# Patient Record
Sex: MALE | Race: WHITE | Employment: FULL TIME | URBAN - METROPOLITAN AREA
[De-identification: names, ages, dates, MRNs, and addresses within clinical notes are randomized per-mention and may not be internally consistent; named-entity substitution may affect disease eponyms.]

---

## 2019-06-24 NOTE — PROGRESS NOTES
"Subjective     Zowey D Ru Everett is a 24 year old male who presents to clinic today for the following health issues:    HPI   Chief Complaint   Patient presents with     EVELYNHMelisaD     Would like to get back on medication     Health Maintenance     BRENDAN, TDAP     Patient presents to establish care  He has a history of ADHD and has been off of his medication for approximately 10 years now.  He's not entirely sure what he used to take.  He was assessed for ADHD at [facility] in 2002 and was put on concerta and ritalin, to which he expresses he had side effects.  He also states that he may have been on vyvanse, however he's not sure what dose and he experienced insomnia, weight loss, lack of appetite and he felt like a 'zombie'.  Regarding work schedule, he usually has to wake up early to take care of 2yo daughter, he works at [employer] from 3pm to 11pm and has had some trouble at work with finishing tasks and focusing.  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  Full [facility] assessment was reviewed via PDF on patient's phone.    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      BP Readings from Last 3 Encounters:   06/25/19 110/76    Wt Readings from Last 3 Encounters:   06/25/19 61 kg (134 lb 6.4 oz)        Family, social, past medical, Reviewed and updated as needed this visit by Provider  Tobacco  Allergies  Meds  Problems  Med Hx  Surg Hx  Fam Hx  Soc Hx          Review of Systems    ROS: 10 point ROS neg other than the symptoms noted above in the HPI.      Objective    /76   Pulse 69   Temp 97  F (36.1  C) (Oral)   Resp 18   Ht 1.73 m (5' 8.11\")   Wt 61 kg (134 lb 6.4 oz)   SpO2 97%   BMI 20.37 kg/m    Body mass index is 20.37 kg/m .  Physical Exam   GENERAL: healthy, alert and no " distress  EYES: Eyes grossly normal to inspection, PERRL and conjunctivae and sclerae normal  NECK: no adenopathy, no asymmetry, masses, or scars and thyroid normal to palpation  RESP: lungs clear to auscultation - no rales, rhonchi or wheezes  CV: regular rate and rhythm, normal S1 S2, no S3 or S4, no murmur, click or rub, no peripheral edema and peripheral pulses strong  SKIN: no suspicious lesions or rashes  PSYCH: mentation appears normal, affect normal/bright          Assessment & Plan       ICD-10-CM    1. Attention deficit hyperactivity disorder (ADHD), combined type F90.2 amphetamine-dextroamphetamine (ADDERALL) 20 MG tablet   2. ADHD (attention deficit hyperactivity disorder), combined type F90.2    3. Traumatic tear of left rotator cuff, unspecified tear extent, sequela S46.012S    4. 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  EDS - patient declined genetic testing at this time    A total of 30 minutes spent face-to-face with greater than 50% of the time spent in counseling and coordinating cares of the issues above     Follow-up in 1 month    Akbar Fajardo MD  [facility]

## 2019-06-25 ENCOUNTER — OFFICE VISIT (OUTPATIENT)
Dept: FAMILY MEDICINE | Facility: CLINIC | Age: 24
End: 2019-06-25

## 2019-06-25 VITALS
SYSTOLIC BLOOD PRESSURE: 110 MMHG | RESPIRATION RATE: 18 BRPM | WEIGHT: 134.4 LBS | HEIGHT: 68 IN | HEART RATE: 69 BPM | BODY MASS INDEX: 20.37 KG/M2 | DIASTOLIC BLOOD PRESSURE: 76 MMHG | TEMPERATURE: 97 F | OXYGEN SATURATION: 97 %

## 2019-06-25 DIAGNOSIS — S46.012S TRAUMATIC TEAR OF LEFT ROTATOR CUFF, UNSPECIFIED TEAR EXTENT, SEQUELA: ICD-10-CM

## 2019-06-25 DIAGNOSIS — Q79.60 EHLERS-DANLOS SYNDROME: ICD-10-CM

## 2019-06-25 DIAGNOSIS — F90.2 ATTENTION DEFICIT HYPERACTIVITY DISORDER (ADHD), COMBINED TYPE: Primary | ICD-10-CM

## 2019-06-25 DIAGNOSIS — F90.2 ADHD (ATTENTION DEFICIT HYPERACTIVITY DISORDER), COMBINED TYPE: ICD-10-CM

## 2019-06-25 PROCEDURE — 99203 OFFICE O/P NEW LOW 30 MIN: CPT | Performed by: FAMILY MEDICINE

## 2019-06-25 RX ORDER — DEXTROAMPHETAMINE SACCHARATE, AMPHETAMINE ASPARTATE, DEXTROAMPHETAMINE SULFATE AND AMPHETAMINE SULFATE 5; 5; 5; 5 MG/1; MG/1; MG/1; MG/1
20 TABLET ORAL 2 TIMES DAILY
Qty: 60 TABLET | Refills: 0 | Status: SHIPPED | OUTPATIENT
Start: 2019-06-25 | End: 2019-10-24

## 2019-06-25 SDOH — HEALTH STABILITY: MENTAL HEALTH: HOW OFTEN DO YOU HAVE A DRINK CONTAINING ALCOHOL?: NEVER

## 2019-06-25 ASSESSMENT — MIFFLIN-ST. JEOR: SCORE: 1575.88

## 2019-07-16 ENCOUNTER — TELEPHONE (OUTPATIENT)
Dept: FAMILY MEDICINE | Facility: CLINIC | Age: 24
End: 2019-07-16

## 2019-07-16 NOTE — TELEPHONE ENCOUNTER
Reason for Call:  Other :PATIENT DROP OFF PAPERS TO BE LOOKED [id number]}    Detailed comments: **DR. PATEL REQUEST THESE PAPER FROM [facility]    Phone Number Patient can be reached at: [phone]    Best Time: ANY    Can we leave a detailed message on this number? YES    Call taken on 7/16/2019 at 4:09 PM by Dinorah Miner

## 2019-07-19 ENCOUNTER — OFFICE VISIT (OUTPATIENT)
Dept: FAMILY MEDICINE | Facility: CLINIC | Age: 24
End: 2019-07-19

## 2019-07-19 VITALS
OXYGEN SATURATION: 99 % | SYSTOLIC BLOOD PRESSURE: 108 MMHG | TEMPERATURE: 98.5 F | HEART RATE: 77 BPM | DIASTOLIC BLOOD PRESSURE: 60 MMHG | WEIGHT: 138 LBS | BODY MASS INDEX: 20.92 KG/M2

## 2019-07-19 DIAGNOSIS — F90.2 ATTENTION DEFICIT HYPERACTIVITY DISORDER (ADHD), COMBINED TYPE: ICD-10-CM

## 2019-07-19 PROCEDURE — 99213 OFFICE O/P EST LOW 20 MIN: CPT | Performed by: FAMILY MEDICINE

## 2019-07-19 RX ORDER — DEXTROAMPHETAMINE SACCHARATE, AMPHETAMINE ASPARTATE, DEXTROAMPHETAMINE SULFATE AND AMPHETAMINE SULFATE 5; 5; 5; 5 MG/1; MG/1; MG/1; MG/1
20 TABLET ORAL 2 TIMES DAILY
Qty: 60 TABLET | Refills: 0 | Status: CANCELLED | OUTPATIENT
Start: 2019-07-19

## 2019-07-19 RX ORDER — DEXTROAMPHETAMINE SACCHARATE, AMPHETAMINE ASPARTATE, DEXTROAMPHETAMINE SULFATE AND AMPHETAMINE SULFATE 5; 5; 5; 5 MG/1; MG/1; MG/1; MG/1
20 TABLET ORAL 2 TIMES DAILY
Qty: 60 TABLET | Refills: 0 | Status: SHIPPED | OUTPATIENT
Start: 2019-08-26 | End: 2019-10-24

## 2019-07-19 RX ORDER — DEXTROAMPHETAMINE SACCHARATE, AMPHETAMINE ASPARTATE, DEXTROAMPHETAMINE SULFATE AND AMPHETAMINE SULFATE 5; 5; 5; 5 MG/1; MG/1; MG/1; MG/1
20 TABLET ORAL 2 TIMES DAILY
Qty: 60 TABLET | Refills: 0 | Status: SHIPPED | OUTPATIENT
Start: 2019-09-26 | End: 2019-10-26

## 2019-07-19 RX ORDER — DEXTROAMPHETAMINE SACCHARATE, AMPHETAMINE ASPARTATE, DEXTROAMPHETAMINE SULFATE AND AMPHETAMINE SULFATE 5; 5; 5; 5 MG/1; MG/1; MG/1; MG/1
20 TABLET ORAL 2 TIMES DAILY
Qty: 60 TABLET | Refills: 0 | Status: SHIPPED | OUTPATIENT
Start: 2019-07-26 | End: 2019-10-24

## 2019-07-19 NOTE — PROGRESS NOTES
Subjective     Rose Everett is a 24 year old male who presents to clinic today for the following health issues:    HPI   Chief Complaint   Patient presents with     RECHECK     ADHD follow up     Health Maintenance     PHQ2, TDAP     Patient presents for ADHD follow-up visit.    Adderall 20mg IR BID has been effective for him.  He's meeting goals at work, able to complete tasks effectively.  No side effects reported.  -------------------------------------    BP Readings from Last 3 Encounters:   07/19/19 108/60   06/25/19 110/76    Wt Readings from Last 3 Encounters:   07/19/19 62.6 kg (138 lb)   06/25/19 61 kg (134 lb 6.4 oz)        Reviewed and updated as needed this visit by Provider  Tobacco  Allergies  Meds  Problems  Med Hx  Surg Hx  Fam Hx         Review of Systems   ROS COMP: Constitutional, HEENT, cardiovascular, pulmonary, gi and gu systems are negative, except as otherwise noted.      Objective    /60   Pulse 77   Temp 98.5  F (36.9  C) (Oral)   Wt 62.6 kg (138 lb)   SpO2 99%   BMI 20.92 kg/m     Physical Exam   GENERAL: healthy, alert and no distress  EYES: Eyes grossly normal to inspection, PERRL and conjunctivae and sclerae normal  NECK: no adenopathy, no asymmetry, masses, or scars and thyroid normal to palpation  RESP: lungs clear to auscultation - no rales, rhonchi or wheezes  CV: regular rate and rhythm, normal S1 S2, no S3 or S4, no murmur, click or rub, no peripheral edema and peripheral pulses strong  SKIN: no suspicious lesions or rashes  PSYCH: mentation appears normal, affect normal/bright          Assessment & Plan       ICD-10-CM    1. Attention deficit hyperactivity disorder (ADHD), combined type F90.2 amphetamine-dextroamphetamine (ADDERALL) 20 MG tablet     amphetamine-dextroamphetamine (ADDERALL) 20 MG tablet     amphetamine-dextroamphetamine (ADDERALL) 20 MG tablet        Medication refill, follow-up in 3-6 months    Return in about 6 months (around  1/19/2020) for Next Scheduled visit with PCP.    Akbar Fajardo MD  [facility]

## 2019-08-22 ENCOUNTER — TELEPHONE (OUTPATIENT)
Dept: FAMILY MEDICINE | Facility: CLINIC | Age: 24
End: 2019-08-22

## 2019-08-22 NOTE — TELEPHONE ENCOUNTER
Called patient and informed he has scripts from the last OV. Patient then informed he is needing an early fill due to death in family and needing to leave state. Then called pharmacy and informed patient needing early pickup and MD then confirmed with pharmacy and gave verbal okay. MA then called patient back and informed that pharmacy has been given the okay to fill script early. Patient thanked MA. No further questions at this time. Tammy Young MA

## 2019-08-22 NOTE — TELEPHONE ENCOUNTER
Reason for call:  Medication     If this is a refill request, has the caller requested the refill from the pharmacy already? Yes     Will the patient be using a [facility]? No     Name of the pharmacy and phone number for the current request: [facility] [phone]    Name of the medication requested: amphetamine-dextroamphetamine (ADDERALL) 20 MG tablet    Other request: Call patient to     Phone number to reach patient:  Other phone number:  [phone]    Best Time:  any    Can we leave a detailed message on this number?  YES

## 2019-10-24 ENCOUNTER — OFFICE VISIT (OUTPATIENT)
Dept: FAMILY MEDICINE | Facility: CLINIC | Age: 24
End: 2019-10-24

## 2019-10-24 VITALS
OXYGEN SATURATION: 100 % | WEIGHT: 138 LBS | RESPIRATION RATE: 16 BRPM | HEART RATE: 80 BPM | HEIGHT: 68 IN | BODY MASS INDEX: 20.92 KG/M2 | SYSTOLIC BLOOD PRESSURE: 134 MMHG | DIASTOLIC BLOOD PRESSURE: 86 MMHG | TEMPERATURE: 97.7 F

## 2019-10-24 DIAGNOSIS — F90.9 ATTENTION DEFICIT HYPERACTIVITY DISORDER (ADHD), UNSPECIFIED ADHD TYPE: Primary | ICD-10-CM

## 2019-10-24 PROCEDURE — 99213 OFFICE O/P EST LOW 20 MIN: CPT | Performed by: FAMILY MEDICINE

## 2019-10-24 RX ORDER — DEXTROAMPHETAMINE SACCHARATE, AMPHETAMINE ASPARTATE, DEXTROAMPHETAMINE SULFATE AND AMPHETAMINE SULFATE 5; 5; 5; 5 MG/1; MG/1; MG/1; MG/1
20 TABLET ORAL 2 TIMES DAILY
Qty: 60 TABLET | Refills: 0 | Status: SHIPPED | OUTPATIENT
Start: 2019-10-24 | End: 2019-11-23

## 2019-10-24 RX ORDER — DEXTROAMPHETAMINE SACCHARATE, AMPHETAMINE ASPARTATE, DEXTROAMPHETAMINE SULFATE AND AMPHETAMINE SULFATE 5; 5; 5; 5 MG/1; MG/1; MG/1; MG/1
20 TABLET ORAL 2 TIMES DAILY
Qty: 60 TABLET | Refills: 0 | Status: SHIPPED | OUTPATIENT
Start: 2019-12-23 | End: 2020-01-22

## 2019-10-24 RX ORDER — DEXTROAMPHETAMINE SACCHARATE, AMPHETAMINE ASPARTATE, DEXTROAMPHETAMINE SULFATE AND AMPHETAMINE SULFATE 5; 5; 5; 5 MG/1; MG/1; MG/1; MG/1
20 TABLET ORAL 2 TIMES DAILY
Qty: 60 TABLET | Refills: 0 | Status: SHIPPED | OUTPATIENT
Start: 2019-11-23 | End: 2019-12-23

## 2019-10-24 ASSESSMENT — MIFFLIN-ST. JEOR: SCORE: 1592.21

## 2019-10-24 NOTE — PATIENT INSTRUCTIONS
Patient Education     Treating Attention-Deficit/Hyperactivity Disorder (ADHD) in Adults  Attention-deficit/hyperactivity disorder (ADHD) starts in childhood. It may continue throughout your life. When it does, it may affect your job and even your relationships. Fortunately, with help, you can manage ADHD.     Treatment for ADHD can help you achieve your goals.   Therapy  Your therapist can help you learn healthy ways to cope with ADHD. Sometimes, your partner or family may attend your sessions with you. This helps them understand more about your disorder.  Coaching  An ADHD  works with you to achieve your goals. You ll learn the best ways to manage your time. You ll also learn to avoid clutter and noise that may distract you. In time, your life will have more order and structure. And your  will provide support and feedback on your progress.  Work  Look for jobs where you can be free and creative. Stay away from those that are dull or centered on details. You may still need to make a special effort. The following tips may help:    Try to work at home, at least part time.    Ask for a private office.    Use headphones to muffle noise.    Work on more than one project at the same time. When you get bored with one, switch to the other.    Work on boring tasks when you feel most alert.    Have a schedule for each day.    Ask your  or  to help with details.    Use a day planner and to-do lists. Write yourself notes.    Reward yourself when you finish a task.  Medicines  In some cases, medicines can help control symptoms of ADHD. Most often, you'll use medicine along with therapy, coaching, and lifestyle changes. Your healthcare provider may prescribe a stimulant to help you stay focused. Or you may take a type of antidepressant. It may take some time to find what works best for you. Keep in mind that medicines don t cure ADHD. And they may cause side effects such as headaches, trouble  sleeping, or stomach problems. Take your medicine as prescribed. If you re bothered by side effects, be sure to tell your healthcare provider.  Always talk with your healthcare provider before making any changes to your medicine.  Date Last Reviewed: 1/1/2017 2000-2018 [employer]. [address] [place] [id number]. All rights reserved. This information is not intended as a substitute for professional medical care. Always follow your healthcare professional's instructions.           Patient Education     Amphetamine; Dextroamphetamine tablets  Brand Name: Adderall  What is this medicine?  AMPHETAMINE; DEXTROAMPHETAMINE(am FET a meen; dex troe am FET a meen) is used to treat attention-deficit hyperactivity disorder (ADHD). It may also be used for narcolepsy. Federal law prohibits giving this medicine to any person other than the person for whom it was prescribed. Do not share this medicine with anyone else.  How should I use this medicine?  Take this medicine by mouth with a glass of water. Follow the directions on the prescription label. Take your doses at regular intervals. Do not take your medicine more often than directed. Do not suddenly stop your medicine. You must gradually reduce the dose or you may feel withdrawal effects. Ask your doctor or health care professional for advice.  Talk to your pediatrician regarding the use of this medicine in children. Special care may be needed. While this drug may be prescribed for children as young as 3 years for selected conditions, precautions do apply.  What side effects may I notice from receiving this medicine?  Side effects that you should report to your doctor or health care professional as soon as possible:    allergic reactions like skin rash, itching or hives, swelling of the face, lips, or tongue    anxious    breathing problems    changes in emotions or moods    changes in vision    chest pain or chest tightness    fast, irregular  heartbeat    fingers or toes feel numb, cool, painful    hallucination, loss of contact with reality    high blood pressure    males: prolonged or painful erection    seizures    signs and symptoms of serotonin syndrome like confusion, increased sweating, fever, tremor, stiff muscles, diarrhea    signs and symptoms of a stroke like changes in vision; confusion; trouble speaking or understanding; severe headaches; sudden numbness or weakness of the face, arm or leg; trouble walking; dizziness; loss of balance or coordination    suicidal thoughts or other mood changes    uncontrollable head, mouth, neck, arm, or leg movements  Side effects that usually do not require medical attention (report to your doctor or health care professional if they continue or are bothersome):    dry mouth    headache    irritability    loss of appetite    nausea    trouble sleeping    weight loss  What may interact with this medicine?  Do not take this medicine with any of the following medications:    MAOIs like Carbex, Eldepryl, Marplan, Nardil, and Parnate    other stimulant medicines for attention disorders  This medicine may also interact with the following medications:    acetazolamide    ammonium chloride    antacids    ascorbic acid    atomoxetine    caffeine    certain medicines for blood pressure    certain medicines for depression, anxiety, or psychotic disturbances    certain medicines for seizures like carbamazepine, phenobarbital, phenytoin    certain medicines for stomach problems like cimetidine, ranitidine, famotidine, esomeprazole, omeprazole, lansoprazole, pantoprazole    lithium    medicines for colds and breathing difficulties    medicines for diabetes    medicines or dietary supplements for weight loss or to stay awake    methenamine    narcotic medicines for pain    quinidine    ritonavir    sodium bicarbonate    [place]'s wort  What if I miss a dose?  If you miss a dose, take it as soon as you can. If it is almost  time for your next dose, take only that dose. Do not take double or extra doses.  Where should I keep my medicine?  Keep out of the reach of children. This medicine can be abused. Keep your medicine in a safe place to protect it from theft. Do not share this medicine with anyone. Selling or giving away this medicine is dangerous and against the law.  Store at room temperature between 15 and 30 degrees C (59 and 86 degrees F). Keep container tightly closed. Throw away any unused medicine after the expiration date. Dispose of properly. This medicine may cause accidental overdose and death if it is taken by other adults, children, or pets. Mix any unused medicine with a substance like cat litter or coffee grounds. Then throw the medicine away in a sealed container like a sealed bag or a coffee can with a lid. Do not use the medicine after the expiration date.  What should I tell my health care provider before I take this medicine?  They need to know if you have any of these conditions:    anxiety or panic attacks    circulation problems in fingers and toes    glaucoma    hardening or blockages of the arteries or heart blood vessels    heart disease or a heart defect    high blood pressure    history of a drug or alcohol abuse problem    history of stroke    kidney disease    liver disease    mental illness    seizures    suicidal thoughts, plans, or attempt; a previous suicide attempt by you or a family member    thyroid disease    Tourette's syndrome    an unusual or allergic reaction to dextroamphetamine, other amphetamines, other medicines, foods, dyes, or preservatives    pregnant or trying to get pregnant    breast-feeding  What should I watch for while using this medicine?  Visit your doctor or health care professional for regular checks on your progress. This prescription requires that you follow special procedures with your doctor and pharmacy. You will need to have a new written prescription from your doctor  every time you need a refill.  This medicine may affect your concentration, or hide signs of tiredness. Until you know how this medicine affects you, do not drive, ride a bicycle, use machinery, or do anything that needs mental alertness.  Tell your doctor or health care professional if this medicine loses its effects, or if you feel you need to take more than the prescribed amount. Do not change the dosage without talking to your doctor or health care professional.  Decreased appetite is a common side effect when starting this medicine. Eating small, frequent meals or snacks can help. Talk to your doctor if you continue to have poor eating habits. Height and weight growth of a child taking this medicine will be monitored closely.  Do not take this medicine close to bedtime. It may prevent you from sleeping.  If you are going to need surgery, a MRI, CT scan, or other procedure, tell your doctor that you are taking this medicine. You may need to stop taking this medicine before the procedure.  Tell your doctor or healthcare professional right away if you notice unexplained wounds on your fingers and toes while taking this medicine. You should also tell your healthcare provider if you experience numbness or pain, changes in the skin color, or sensitivity to temperature in your fingers or toes.  NOTE:This sheet is a summary. It may not cover all possible information. If you have questions about this medicine, talk to your doctor, pharmacist, or health care provider. Copyright  2019 [employer]

## 2019-10-24 NOTE — PROGRESS NOTES
"Subjective     Rose GANDHI Ru Everett is a 24 year old male who presents to clinic today for the following health issues:    HPI   Chief Complaint   Patient presents with     Recheck Medication     amphetamine-dextroamphetamine (ADDERALL) 20 MG tablet     Patient presents for ADHD follow-up visit  Doing well, meeting goals, effective at work  No reported side effects        BP Readings from Last 3 Encounters:   10/24/19 134/86   07/19/19 108/60   06/25/19 110/76    Wt Readings from Last 3 Encounters:   10/24/19 62.6 kg (138 lb)   07/19/19 62.6 kg (138 lb)   06/25/19 61 kg (134 lb 6.4 oz)                      Reviewed and updated as needed this visit by Provider  Tobacco  Allergies  Meds  Problems  Med Hx  Surg Hx  Fam Hx         Review of Systems   ROS COMP: Constitutional, HEENT, cardiovascular, pulmonary, gi and gu systems are negative, except as otherwise noted.      Objective    /86   Pulse 80   Temp 97.7  F (36.5  C) (Oral)   Resp 16   Ht 1.73 m (5' 8.11\")   Wt 62.6 kg (138 lb)   SpO2 100%   BMI 20.92 kg/m    Body mass index is 20.92 kg/m .  Physical Exam   GENERAL: healthy, alert and no distress  EYES: Eyes grossly normal to inspection, PERRL and conjunctivae and sclerae normal  NECK: no adenopathy, no asymmetry, masses, or scars and thyroid normal to palpation  CV: regular rate and rhythm, normal S1 S2, no S3 or S4, no murmur, click or rub, no peripheral edema and peripheral pulses strong  SKIN: no suspicious lesions or rashes  PSYCH: mentation appears normal, affect normal/bright          Assessment & Plan       ICD-10-CM    1. Attention deficit hyperactivity disorder (ADHD), unspecified ADHD type F90.9 amphetamine-dextroamphetamine (ADDERALL) 20 MG tablet     amphetamine-dextroamphetamine (ADDERALL) 20 MG tablet     amphetamine-dextroamphetamine (ADDERALL) 20 MG tablet        Tobacco Cessation:   reports that he has been smoking cigarettes. He has been smoking about 0.20 packs per day. " He has never used smokeless tobacco.      Return in about 6 months (around 4/24/2020) for ADHD.    Akbar Fajardo MD  [facility]

## 2019-12-16 DIAGNOSIS — F90.9 ATTENTION DEFICIT HYPERACTIVITY DISORDER (ADHD), UNSPECIFIED ADHD TYPE: ICD-10-CM

## 2019-12-16 RX ORDER — DEXTROAMPHETAMINE SACCHARATE, AMPHETAMINE ASPARTATE, DEXTROAMPHETAMINE SULFATE AND AMPHETAMINE SULFATE 5; 5; 5; 5 MG/1; MG/1; MG/1; MG/1
20 TABLET ORAL 2 TIMES DAILY
Qty: 60 TABLET | Refills: 0 | Status: CANCELLED | OUTPATIENT
Start: 2019-12-16

## 2019-12-16 RX ORDER — DEXTROAMPHETAMINE SACCHARATE, AMPHETAMINE ASPARTATE, DEXTROAMPHETAMINE SULFATE AND AMPHETAMINE SULFATE 5; 5; 5; 5 MG/1; MG/1; MG/1; MG/1
20 TABLET ORAL 2 TIMES DAILY
Qty: 60 TABLET | Refills: 0 | Status: CANCELLED | OUTPATIENT
Start: 2019-12-23

## 2019-12-16 NOTE — TELEPHONE ENCOUNTER
Controlled Substance Refill Request for amphetamine-dextroamphetamine (ADDERALL) 20 MG tablet  Problem List Complete:  No     PROVIDER TO CONSIDER COMPLETION OF PROBLEM LIST AND OVERVIEW/CONTROLLED SUBSTANCE AGREEMENT    Last Written Prescription Date:  12/23/2019  Last Fill Quantity: 60,   # refills: 0    THE MOST RECENT OFFICE VISIT MUST BE WITHIN THE PAST 3 MONTHS. AT LEAST ONE FACE TO FACE VISIT MUST OCCUR EVERY 6 MONTHS. ADDITIONAL VISITS CAN BE VIRTUAL.  (THIS STATEMENT SHOULD BE DELETED.)    Last Office Visit with [facility] primary care provider: 10/24/2019    Future Office visit:     Controlled substance agreement:   Encounter-Level CSA:    There are no encounter-level csa.     Patient-Level CSA:    There are no patient-level csa.         Last Urine Drug Screen: No results found for: CDAUT, No results found for: COMDAT, No results found for: THC13, PCP13, COC13, MAMP13, OPI13, AMP13, BZO13, TCA13, MTD13, BAR13, OXY13, PPX13, BUP13         [email or web address]       checked in past 3 months?  No, route to RN

## 2019-12-16 NOTE — TELEPHONE ENCOUNTER
Reason for call:  Medication   If this is a refill request, has the caller requested the refill from the pharmacy already? No  Will the patient be using a [facility]? No  Name of the pharmacy and phone number for the current request: [id number] - [place], [place] - [address] AT [facility]  [phone]    Name of the medication requested: adderall    Other request: Patient will be leaving for [place] on 12-18-19 thur 1-5-20 and is due to refill his medication on 12-23-19. Can this be filled early? Please call.    Phone number to reach patient:  Cell number on file:    Telephone Information:   Mobile [phone]       Best Time:  Before 4:00 pm    Can we leave a detailed message on this number?  YES

## 2019-12-17 NOTE — TELEPHONE ENCOUNTER
Patient stopped in clinic for update on medication refill on 12/17/19 at 2:00PM. Instructed patient to call [facility] to ask them if prescription can be refilled earlier than December 23rd, 2019 due to going out of the state for a month. Also, informed patient he will need to set up an office visit with his primary to receive more refills on the Adderall after this last refill.    Luis F Shaihd  Patient Representative

## 2019-12-19 NOTE — TELEPHONE ENCOUNTER
Left message for patient to return call to clinic    Jazmín Ellington MA on 12/19/2019 at 9:01 AM

## 2020-01-21 ENCOUNTER — OFFICE VISIT (OUTPATIENT)
Dept: FAMILY MEDICINE | Facility: CLINIC | Age: 25
End: 2020-01-21

## 2020-01-21 VITALS
BODY MASS INDEX: 21.19 KG/M2 | OXYGEN SATURATION: 100 % | RESPIRATION RATE: 16 BRPM | DIASTOLIC BLOOD PRESSURE: 72 MMHG | SYSTOLIC BLOOD PRESSURE: 126 MMHG | WEIGHT: 139.8 LBS | HEIGHT: 68 IN | TEMPERATURE: 98 F | HEART RATE: 110 BPM

## 2020-01-21 DIAGNOSIS — F90.9 ATTENTION DEFICIT HYPERACTIVITY DISORDER (ADHD), UNSPECIFIED ADHD TYPE: Primary | ICD-10-CM

## 2020-01-21 PROCEDURE — 99213 OFFICE O/P EST LOW 20 MIN: CPT | Performed by: FAMILY MEDICINE

## 2020-01-21 RX ORDER — DEXTROAMPHETAMINE SACCHARATE, AMPHETAMINE ASPARTATE, DEXTROAMPHETAMINE SULFATE AND AMPHETAMINE SULFATE 5; 5; 5; 5 MG/1; MG/1; MG/1; MG/1
20 TABLET ORAL 2 TIMES DAILY
Qty: 60 TABLET | Refills: 0 | Status: SHIPPED | OUTPATIENT
Start: 2020-03-23 | End: 2020-04-22

## 2020-01-21 RX ORDER — DEXTROAMPHETAMINE SACCHARATE, AMPHETAMINE ASPARTATE, DEXTROAMPHETAMINE SULFATE AND AMPHETAMINE SULFATE 5; 5; 5; 5 MG/1; MG/1; MG/1; MG/1
20 TABLET ORAL 2 TIMES DAILY
Qty: 60 TABLET | Refills: 0 | Status: SHIPPED | OUTPATIENT
Start: 2020-01-21 | End: 2020-02-20

## 2020-01-21 RX ORDER — DEXTROAMPHETAMINE SACCHARATE, AMPHETAMINE ASPARTATE, DEXTROAMPHETAMINE SULFATE AND AMPHETAMINE SULFATE 5; 5; 5; 5 MG/1; MG/1; MG/1; MG/1
20 TABLET ORAL 2 TIMES DAILY
Qty: 60 TABLET | Refills: 0 | Status: SHIPPED | OUTPATIENT
Start: 2020-02-21 | End: 2020-03-22

## 2020-01-21 ASSESSMENT — MIFFLIN-ST. JEOR: SCORE: 1600.38

## 2020-01-21 NOTE — PROGRESS NOTES
"Subjective     Rose Everett is a 24 year old male who presents to clinic today for the following health issues:    HPI   Chief Complaint   Patient presents with     Recheck Medication     amphetamine-dextroamphetamine (ADDERALL) 20 MG tablet     Patient presents for ADHD follow-up visit.  No reported side effects, sleeping well, no weight loss, no appetite changes or palpitations, at goal in terms of productivity.    Wt Readings from Last 4 Encounters:   01/21/20 63.4 kg (139 lb 12.8 oz)   10/24/19 62.6 kg (138 lb)   07/19/19 62.6 kg (138 lb)   06/25/19 61 kg (134 lb 6.4 oz)           BP Readings from Last 3 Encounters:   01/21/20 126/72   10/24/19 134/86   07/19/19 108/60    Wt Readings from Last 3 Encounters:   01/21/20 63.4 kg (139 lb 12.8 oz)   10/24/19 62.6 kg (138 lb)   07/19/19 62.6 kg (138 lb)                      Reviewed and updated as needed this visit by Provider  Tobacco  Allergies  Meds  Problems  Med Hx  Surg Hx  Fam Hx         Review of Systems   ROS COMP: Constitutional, HEENT, cardiovascular, pulmonary, gi and gu systems are negative, except as otherwise noted.      Objective    /72   Pulse 110   Temp 98  F (36.7  C) (Oral)   Resp 16   Ht 1.73 m (5' 8.11\")   Wt 63.4 kg (139 lb 12.8 oz)   SpO2 100%   BMI 21.19 kg/m    Body mass index is 21.19 kg/m .  Physical Exam   GENERAL: healthy, alert and no distress  EYES: Eyes grossly normal to inspection, PERRL and conjunctivae and sclerae normal  NECK: no adenopathy, no asymmetry, masses, or scars and thyroid normal to palpation  SKIN: no suspicious lesions or rashes  PSYCH: mentation appears normal, affect normal/bright          Assessment & Plan       ICD-10-CM    1. Attention deficit hyperactivity disorder (ADHD), unspecified ADHD type F90.9 amphetamine-dextroamphetamine (ADDERALL) 20 MG tablet     amphetamine-dextroamphetamine (ADDERALL) 20 MG tablet     amphetamine-dextroamphetamine (ADDERALL) 20 MG tablet      "         Follow-up in 3-6 months  Akbar Fajardo MD  [facility]

## 2020-04-17 ENCOUNTER — TELEPHONE (OUTPATIENT)
Dept: FAMILY MEDICINE | Facility: CLINIC | Age: 25
End: 2020-04-17

## 2020-04-17 DIAGNOSIS — F90.9 ATTENTION DEFICIT HYPERACTIVITY DISORDER (ADHD), UNSPECIFIED ADHD TYPE: ICD-10-CM

## 2020-04-17 RX ORDER — DEXTROAMPHETAMINE SACCHARATE, AMPHETAMINE ASPARTATE, DEXTROAMPHETAMINE SULFATE AND AMPHETAMINE SULFATE 5; 5; 5; 5 MG/1; MG/1; MG/1; MG/1
20 TABLET ORAL 2 TIMES DAILY
Qty: 60 TABLET | Refills: 0 | Status: SHIPPED | OUTPATIENT
Start: 2020-04-17 | End: 2020-05-17

## 2020-04-17 RX ORDER — DEXTROAMPHETAMINE SACCHARATE, AMPHETAMINE ASPARTATE, DEXTROAMPHETAMINE SULFATE AND AMPHETAMINE SULFATE 5; 5; 5; 5 MG/1; MG/1; MG/1; MG/1
20 TABLET ORAL 2 TIMES DAILY
Qty: 60 TABLET | Refills: 0 | Status: CANCELLED | OUTPATIENT
Start: 2020-04-17

## 2020-04-17 RX ORDER — DEXTROAMPHETAMINE SACCHARATE, AMPHETAMINE ASPARTATE, DEXTROAMPHETAMINE SULFATE AND AMPHETAMINE SULFATE 5; 5; 5; 5 MG/1; MG/1; MG/1; MG/1
20 TABLET ORAL 2 TIMES DAILY
Qty: 60 TABLET | Refills: 0 | Status: SHIPPED | OUTPATIENT
Start: 2020-05-18 | End: 2020-06-17

## 2020-04-17 RX ORDER — DEXTROAMPHETAMINE SACCHARATE, AMPHETAMINE ASPARTATE, DEXTROAMPHETAMINE SULFATE AND AMPHETAMINE SULFATE 5; 5; 5; 5 MG/1; MG/1; MG/1; MG/1
20 TABLET ORAL 2 TIMES DAILY
Qty: 60 TABLET | Refills: 0 | Status: SHIPPED | OUTPATIENT
Start: 2020-06-18 | End: 2020-07-18

## 2020-04-17 NOTE — TELEPHONE ENCOUNTER
Reason for call:  Other   Patient called regarding (reason for call): prescription  Additional comments:  He needs a refill of adderall. Please call when ready.     Phone number to reach patient:  Cell number on file:    Telephone Information:   Mobile [phone]       Best Time:  Any     Can we leave a detailed message on this number?  YES    Travel screening: Not Applicable

## 2020-04-17 NOTE — TELEPHONE ENCOUNTER
[place] ([place] reviewed: Unable to check , provider has not added to delegate account      [place]:  [email or web address]  Princess Murphy RN

## 2020-04-17 NOTE — TELEPHONE ENCOUNTER
Controlled Substance Refill Request for amphetamine-dextroamphetamine (ADDERALL) 20 MG tablet  Problem List Complete:  No     PROVIDER TO CONSIDER COMPLETION OF PROBLEM LIST AND OVERVIEW/CONTROLLED SUBSTANCE AGREEMENT    Last Written Prescription Date:  3/23/2020  Last Fill Quantity: 60,   # refills: 0    THE MOST RECENT OFFICE VISIT MUST BE WITHIN THE PAST 3 MONTHS. AT LEAST ONE FACE TO FACE VISIT MUST OCCUR EVERY 6 MONTHS. ADDITIONAL VISITS CAN BE VIRTUAL.  (THIS STATEMENT SHOULD BE DELETED.)    Last Office Visit with [facility] primary care provider: 1/21/2020    Future Office visit:     Controlled substance agreement:   Encounter-Level CSA:    There are no encounter-level csa.     Patient-Level CSA:    There are no patient-level csa.         Last Urine Drug Screen: No results found for: CDAUT, No results found for: COMDAT, No results found for: THC13, PCP13, COC13, MAMP13, OPI13, AMP13, BZO13, TCA13, MTD13, BAR13, OXY13, PPX13, BUP13     Processing:  Rx to be electronically transmitted to pharmacy by provider      [email or web address]       checked in past 3 months?  No, route to RN

## 2020-07-23 NOTE — PROGRESS NOTES
"Rose Everett is a 25 year old male who is being evaluated via a billable video visit.      The patient has been notified of following:     \"This video visit will be conducted via a call between you and your physician/provider. We have found that certain health care needs can be provided without the need for an in-person physical exam.  This service lets us provide the care you need with a video conversation.  If a prescription is necessary we can send it directly to your pharmacy.  If lab work is needed we can place an order for that and you can then stop by our lab to have the test done at a later time.    Video visits are billed at different rates depending on your insurance coverage.  Please reach out to your insurance provider with any questions.    If during the course of the call the physician/provider feels a video visit is not appropriate, you will not be charged for this service.\"    Patient has given verbal consent for Video visit? Yes  How would you like to obtain your AVS? Mail a copy  If you are dropped from the video visit, the video invite should be resent to: Text to cell phone: [phone]  Will anyone else be joining your video visit? No    Subjective     Rose Everett is a 25 year old male who presents today via video visit for the following health issues:    HPI    Medication Followup of amphetamine-dextroamphetamine (ADDERALL) 20 MG tablet     Taking Medication as prescribed: yes    Side Effects:  None    Medication Helping Symptoms:  yes      Patient presents for ADHD follow-up visit.  No reported side effects, sleeping well, no weight loss, no appetite changes or palpitations, at goal in terms of productivity.      Reviewed and updated as needed this visit by Provider  Tobacco  Allergies  Meds  Problems  Med Hx  Surg Hx  Fam Hx         Review of Systems   Constitutional, HEENT, cardiovascular, pulmonary, gi and gu systems are negative, except as otherwise noted.    "   Objective             Physical Exam     GENERAL: Healthy, alert and no distress  EYES: Eyes grossly normal to inspection.  No discharge or erythema, or obvious scleral/conjunctival abnormalities.  RESP: No audible wheeze, cough, or visible cyanosis.  No visible retractions or increased work of breathing.    SKIN: Visible skin clear. No significant rash, abnormal pigmentation or lesions.  NEURO: Cranial nerves grossly intact.  Mentation and speech appropriate for age.  PSYCH: Mentation appears normal, affect normal/bright, judgement and insight intact, normal speech and appearance well-groomed.              Assessment & Plan       ICD-10-CM    1. Attention deficit hyperactivity disorder (ADHD), unspecified ADHD type  F90.9      Medication refill       Follow-up in 6 months  Akbar Fajardo MD  [facility]      Video-Visit Details    Type of service:  Video Visit    Video Length 6 minutes    Originating Location (pt. Location): Home    Distant Location (provider location):  [facility]     Platform used for Video Visit: Doximity    Return in about 6 months (around 1/24/2021) for Next Scheduled visit with PCP.       Akbar Fajardo MD

## 2020-07-24 ENCOUNTER — VIRTUAL VISIT (OUTPATIENT)
Dept: FAMILY MEDICINE | Facility: CLINIC | Age: 25
End: 2020-07-24

## 2020-07-24 DIAGNOSIS — F90.9 ATTENTION DEFICIT HYPERACTIVITY DISORDER (ADHD), UNSPECIFIED ADHD TYPE: ICD-10-CM

## 2020-07-24 DIAGNOSIS — F90.9 ATTENTION DEFICIT HYPERACTIVITY DISORDER (ADHD), UNSPECIFIED ADHD TYPE: Primary | ICD-10-CM

## 2020-07-24 PROCEDURE — 99213 OFFICE O/P EST LOW 20 MIN: CPT | Mod: 95 | Performed by: FAMILY MEDICINE

## 2020-07-24 NOTE — TELEPHONE ENCOUNTER
Reason for Call:  Other prescription    Detailed comments: Patient calling, states he spoke with the provider and was supposed to have an Adderall medication sent over to his pharmacy but they have not received anything yet. Please call back if there are any complications.    amphetamine-dextroamphetamine (ADDERALL) 20 MG tablet    Please send to [id number] - [place], [place] - [address] AT [facility] & [place]    Phone Number Patient can be reached at: Cell number on file:    Telephone Information:   Mobile [phone]       Best Time: any    Can we leave a detailed message on this number? YES    Call taken on 7/24/2020 at 3:02 PM by Deion Freitas

## 2020-07-27 DIAGNOSIS — F90.9 ATTENTION DEFICIT HYPERACTIVITY DISORDER (ADHD), UNSPECIFIED ADHD TYPE: Primary | ICD-10-CM

## 2020-07-27 RX ORDER — DEXTROAMPHETAMINE SACCHARATE, AMPHETAMINE ASPARTATE, DEXTROAMPHETAMINE SULFATE AND AMPHETAMINE SULFATE 5; 5; 5; 5 MG/1; MG/1; MG/1; MG/1
20 TABLET ORAL 2 TIMES DAILY
Qty: 60 TABLET | Refills: 0 | Status: SHIPPED | OUTPATIENT
Start: 2020-07-27 | End: 2020-08-26

## 2020-07-27 RX ORDER — DEXTROAMPHETAMINE SACCHARATE, AMPHETAMINE ASPARTATE, DEXTROAMPHETAMINE SULFATE AND AMPHETAMINE SULFATE 5; 5; 5; 5 MG/1; MG/1; MG/1; MG/1
20 TABLET ORAL 2 TIMES DAILY
Qty: 60 TABLET | Refills: 0 | Status: SHIPPED | OUTPATIENT
Start: 2020-08-27 | End: 2020-09-26

## 2020-07-27 RX ORDER — DEXTROAMPHETAMINE SACCHARATE, AMPHETAMINE ASPARTATE, DEXTROAMPHETAMINE SULFATE AND AMPHETAMINE SULFATE 5; 5; 5; 5 MG/1; MG/1; MG/1; MG/1
20 TABLET ORAL 2 TIMES DAILY
Qty: 60 TABLET | Refills: 0 | Status: SHIPPED | OUTPATIENT
Start: 2020-09-27 | End: 2020-10-27

## 2020-07-27 NOTE — TELEPHONE ENCOUNTER
Routing refill request to provider for review/approval because:  Drug not on the [facility] refill protocol     Unable to review . Provider has not added to delegate account.    Luz Pope RN  [facility]

## 2020-07-28 RX ORDER — DEXTROAMPHETAMINE SACCHARATE, AMPHETAMINE ASPARTATE, DEXTROAMPHETAMINE SULFATE AND AMPHETAMINE SULFATE 5; 5; 5; 5 MG/1; MG/1; MG/1; MG/1
20 TABLET ORAL 2 TIMES DAILY
Qty: 60 TABLET | Refills: 0 | OUTPATIENT
Start: 2020-07-28

## 2020-10-21 DIAGNOSIS — F90.9 ATTENTION DEFICIT HYPERACTIVITY DISORDER (ADHD), UNSPECIFIED ADHD TYPE: ICD-10-CM

## 2020-10-21 RX ORDER — DEXTROAMPHETAMINE SACCHARATE, AMPHETAMINE ASPARTATE, DEXTROAMPHETAMINE SULFATE AND AMPHETAMINE SULFATE 5; 5; 5; 5 MG/1; MG/1; MG/1; MG/1
20 TABLET ORAL 2 TIMES DAILY
Qty: 60 TABLET | Refills: 0 | Status: CANCELLED | OUTPATIENT
Start: 2020-10-21

## 2020-10-21 NOTE — TELEPHONE ENCOUNTER
Controlled Substance Refill Request for amphetamine-dextroamphetamine (ADDERALL) 20 MG tablet  Problem List Complete:  No     PROVIDER TO CONSIDER COMPLETION OF PROBLEM LIST AND OVERVIEW/CONTROLLED SUBSTANCE AGREEMENT    Last Written Prescription Date:  09/27/2020  Last Fill Quantity: 60,   # refills: 0    THE MOST RECENT OFFICE VISIT MUST BE WITHIN THE PAST 3 MONTHS. AT LEAST ONE FACE TO FACE VISIT MUST OCCUR EVERY 6 MONTHS. ADDITIONAL VISITS CAN BE VIRTUAL.  (THIS STATEMENT SHOULD BE DELETED.)    Last Office Visit with [facility] primary care provider: 07/24/2020 Virtual Visit with Dr. Mensah    Future Office visit:     Controlled substance agreement:   Encounter-Level CSA:    There are no encounter-level csa.     Patient-Level CSA:    There are no patient-level csa.         Last Urine Drug Screen: No results found for: CDAUT, No results found for: COMDAT, No results found for: THC13, PCP13, COC13, MAMP13, OPI13, AMP13, BZO13, TCA13, MTD13, BAR13, OXY13, PPX13, BUP13     Processing:  Rx to be electronically transmitted to pharmacy by provider      [email or web address]       checked in past 3 months?  No, route to LICO Freitas CMA

## 2020-10-21 NOTE — TELEPHONE ENCOUNTER
Reason for Call:  Medication or medication refill:    Do you use a [facility]?  Name of the pharmacy and phone number for the current request:  [facility] [phone]    Name of the medication requested: Attila    Other request: na    Can we leave a detailed message on this number? YES    Phone number patient can be reached at: Home number on file [phone] (home)    Best Time: na    Call taken on 10/21/2020 at 1:12 PM by Donato Brennan

## 2020-10-22 NOTE — TELEPHONE ENCOUNTER
Please delegate writer to  if you would like  report review.    Luz Pope RN  [facility]

## 2020-10-26 RX ORDER — DEXTROAMPHETAMINE SACCHARATE, AMPHETAMINE ASPARTATE MONOHYDRATE, DEXTROAMPHETAMINE SULFATE AND AMPHETAMINE SULFATE 5; 5; 5; 5 MG/1; MG/1; MG/1; MG/1
20 CAPSULE, EXTENDED RELEASE ORAL 2 TIMES DAILY
Qty: 60 CAPSULE | Refills: 0 | Status: SHIPPED | OUTPATIENT
Start: 2020-10-26 | End: 2020-10-27

## 2020-10-26 RX ORDER — DEXTROAMPHETAMINE SACCHARATE, AMPHETAMINE ASPARTATE MONOHYDRATE, DEXTROAMPHETAMINE SULFATE AND AMPHETAMINE SULFATE 5; 5; 5; 5 MG/1; MG/1; MG/1; MG/1
20 CAPSULE, EXTENDED RELEASE ORAL 2 TIMES DAILY
Qty: 60 CAPSULE | Refills: 0 | Status: SHIPPED | OUTPATIENT
Start: 2020-11-26 | End: 2020-10-27

## 2020-10-26 RX ORDER — DEXTROAMPHETAMINE SACCHARATE, AMPHETAMINE ASPARTATE MONOHYDRATE, DEXTROAMPHETAMINE SULFATE AND AMPHETAMINE SULFATE 5; 5; 5; 5 MG/1; MG/1; MG/1; MG/1
20 CAPSULE, EXTENDED RELEASE ORAL 2 TIMES DAILY
Qty: 60 CAPSULE | Refills: 0 | Status: SHIPPED | OUTPATIENT
Start: 2020-12-27 | End: 2020-10-27

## 2020-10-26 NOTE — TELEPHONE ENCOUNTER
Reason for Call:  Other prescription    Detailed comments: Pharmacy calling, they would like to know if the provider is going to be prescribing the extended release instead of immediate release for the adderall. Please call back.     Phone Number: [phone]    Best Time: any    Can we leave a detailed message on this number? YES    Call taken on 10/26/2020 at 2:46 PM by Deion Freitas

## 2020-10-26 NOTE — TELEPHONE ENCOUNTER
Reason for Call:  Other prescription    Detailed comments: Patient calling in regards to medication request. Patient needs the medication before he leaves on his flight on 10/28/20. He also would like a call back if the provider is prescribing the extended release. Since the patient has mainly been taking the immediate release. Please call back.    Phone Number Patient can be reached at: Cell number on file:    Telephone Information:   Mobile [phone]       Best Time: any    Can we leave a detailed message on this number? YES    Call taken on 10/26/2020 at 4:15 PM by Deion Freitas

## 2020-10-26 NOTE — TELEPHONE ENCOUNTER
Reason for Call:  Other prescription    Detailed comments: pt NEEDS rx: adderall.  Pt going out of town tomorrow.  Uses [facility]    Phone Number Patient can be reached at: Home number on file [phone] (home)    Best Time: any    Can we leave a detailed message on this number? YES    Call taken on 10/26/2020 at 9:26 AM by Estefania Crocker

## 2020-10-27 RX ORDER — DEXTROAMPHETAMINE SACCHARATE, AMPHETAMINE ASPARTATE, DEXTROAMPHETAMINE SULFATE AND AMPHETAMINE SULFATE 5; 5; 5; 5 MG/1; MG/1; MG/1; MG/1
20 TABLET ORAL 2 TIMES DAILY
Qty: 60 TABLET | Refills: 0 | Status: SHIPPED | OUTPATIENT
Start: 2020-10-27 | End: 2020-11-26

## 2020-10-27 RX ORDER — DEXTROAMPHETAMINE SACCHARATE, AMPHETAMINE ASPARTATE, DEXTROAMPHETAMINE SULFATE AND AMPHETAMINE SULFATE 5; 5; 5; 5 MG/1; MG/1; MG/1; MG/1
20 TABLET ORAL 2 TIMES DAILY
Qty: 60 TABLET | Refills: 0 | Status: SHIPPED | OUTPATIENT
Start: 2020-12-28 | End: 2021-01-27

## 2020-10-27 RX ORDER — DEXTROAMPHETAMINE SACCHARATE, AMPHETAMINE ASPARTATE, DEXTROAMPHETAMINE SULFATE AND AMPHETAMINE SULFATE 5; 5; 5; 5 MG/1; MG/1; MG/1; MG/1
20 TABLET ORAL 2 TIMES DAILY
Qty: 60 TABLET | Refills: 0 | Status: SHIPPED | OUTPATIENT
Start: 2020-11-27 | End: 2020-12-27

## 2020-10-27 NOTE — TELEPHONE ENCOUNTER
Please see refill encounter 10/21/2020. Dr. Mensah sent Adderall 20mg to pharmacy. Called and spoke to patient. Informed patient that prescriptions sent to pharmacy and patient verbally understand.  Clarissa Freitas CMA

## 2020-10-27 NOTE — TELEPHONE ENCOUNTER
Incoming call from patient. Informed patient that amphetamine-dextroamphetamine (ADDERALL XR) 20 MG 24 hr capsule was sent yesterday by Dr. Mensah. Patient stated that is was taking regular Adderall and not the extended release. Patient stated that when he take extended release Adderall he is unable to eat or sleep. Patient prefers the regular Adderall. Please send new prescriptions to pharmacy.  Patient will be leaving out of town tomorrow so he will need his medication.     Clarissa Freitas, CMA      c/o dizziness x over a week , saw pmd states took medicine for 4 days , feels a little better but still feels imbalance when walking

## 2020-12-10 ENCOUNTER — TELEPHONE (OUTPATIENT)
Dept: FAMILY MEDICINE | Facility: CLINIC | Age: 25
End: 2020-12-10

## 2020-12-10 NOTE — TELEPHONE ENCOUNTER
Reason for call:  Other   Patient called regarding (reason for call): prescription  Additional comments: Patient going out of town from 12/15-01/20 for holidays, patient is wondering if he will be able to get his adderall prescription in another state. Please call to advise.         Phone number to reach patient:  Home number on file [phone] (home)    Best Time:  Any     Can we leave a detailed message on this number?  YES    Travel screening: Not Applicable

## 2020-12-10 NOTE — TELEPHONE ENCOUNTER
Patient is due for in person visit so should be scheduled     Postdated prescriptions should be on file     Please call pharmacy to confirm rules on transferring prescriptions or early fill for travel     Aviva Campos MD

## 2020-12-11 NOTE — TELEPHONE ENCOUNTER
Called patient's pharmacy and and advised that Dr. Campos approved an early fill for adderall 20 mg.    [facility] team, please call patient to advise on overdue follow up.    Mal Smith RN....12/11/2020 5:26 PM

## 2020-12-11 NOTE — TELEPHONE ENCOUNTER
Provider-- Called [facility] and spoke with pharmacist. They state that both provider and insurance would have to approve an early refill. They do have a script on file. Pt may have insurance that provides overrides for coverage when out on vacation, if he does not he would have to pay out of pocket or provider would have to send a new prescription electronically to pharmacy that pt will have access to out of state.  Please advise. Okay to give pharmacy approval from provider for early refill?     Team [facility]-- please call patient to notify that he is due for appointment

## 2021-01-26 ENCOUNTER — OFFICE VISIT (OUTPATIENT)
Dept: FAMILY MEDICINE | Facility: CLINIC | Age: 26
End: 2021-01-26

## 2021-01-26 VITALS
WEIGHT: 142 LBS | DIASTOLIC BLOOD PRESSURE: 84 MMHG | SYSTOLIC BLOOD PRESSURE: 120 MMHG | HEART RATE: 85 BPM | OXYGEN SATURATION: 100 % | TEMPERATURE: 98 F | BODY MASS INDEX: 19.88 KG/M2 | HEIGHT: 71 IN

## 2021-01-26 DIAGNOSIS — Z79.899 CONTROLLED SUBSTANCE AGREEMENT SIGNED: ICD-10-CM

## 2021-01-26 DIAGNOSIS — F90.9 ATTENTION DEFICIT HYPERACTIVITY DISORDER (ADHD), UNSPECIFIED ADHD TYPE: ICD-10-CM

## 2021-01-26 PROCEDURE — 93000 ELECTROCARDIOGRAM COMPLETE: CPT | Performed by: FAMILY MEDICINE

## 2021-01-26 PROCEDURE — 99213 OFFICE O/P EST LOW 20 MIN: CPT | Performed by: FAMILY MEDICINE

## 2021-01-26 RX ORDER — DEXTROAMPHETAMINE SACCHARATE, AMPHETAMINE ASPARTATE, DEXTROAMPHETAMINE SULFATE AND AMPHETAMINE SULFATE 5; 5; 5; 5 MG/1; MG/1; MG/1; MG/1
20 TABLET ORAL 2 TIMES DAILY
Qty: 60 TABLET | Refills: 0 | Status: SHIPPED | OUTPATIENT
Start: 2021-03-29 | End: 2021-04-28

## 2021-01-26 RX ORDER — DEXTROAMPHETAMINE SACCHARATE, AMPHETAMINE ASPARTATE, DEXTROAMPHETAMINE SULFATE AND AMPHETAMINE SULFATE 5; 5; 5; 5 MG/1; MG/1; MG/1; MG/1
20 TABLET ORAL 2 TIMES DAILY
Qty: 60 TABLET | Refills: 0 | Status: SHIPPED | OUTPATIENT
Start: 2021-02-26 | End: 2021-03-24

## 2021-01-26 RX ORDER — DEXTROAMPHETAMINE SACCHARATE, AMPHETAMINE ASPARTATE, DEXTROAMPHETAMINE SULFATE AND AMPHETAMINE SULFATE 5; 5; 5; 5 MG/1; MG/1; MG/1; MG/1
20 TABLET ORAL 2 TIMES DAILY
Qty: 60 TABLET | Refills: 0 | Status: CANCELLED | OUTPATIENT
Start: 2021-01-26

## 2021-01-26 RX ORDER — DEXTROAMPHETAMINE SACCHARATE, AMPHETAMINE ASPARTATE, DEXTROAMPHETAMINE SULFATE AND AMPHETAMINE SULFATE 5; 5; 5; 5 MG/1; MG/1; MG/1; MG/1
20 TABLET ORAL 2 TIMES DAILY
Qty: 60 TABLET | Refills: 0 | Status: SHIPPED | OUTPATIENT
Start: 2021-01-26 | End: 2021-02-25

## 2021-01-26 ASSESSMENT — MIFFLIN-ST. JEOR: SCORE: 1651.24

## 2021-01-26 NOTE — PROGRESS NOTES
"  Assessment & Plan       ICD-10-CM    1. Attention deficit hyperactivity disorder (ADHD), unspecified ADHD type  F90.9 EKG 12-lead complete w/read - Clinics     amphetamine-dextroamphetamine (ADDERALL) 20 MG tablet     amphetamine-dextroamphetamine (ADDERALL) 20 MG tablet     amphetamine-dextroamphetamine (ADDERALL) 20 MG tablet   2. Controlled substance agreement signed  Z79.899        Return in about 6 months (around 7/26/2021) for ADHD.    Akbar Fajardo MD  [facility] [place] Rose is a 25 year old who presents to clinic today for the following health issues     HPI       Medication Followup of amphetamine-dextroamphetamine (ADDERALL) 20 MG tablet    Taking Medication as prescribed: yes    Side Effects:  None    Medication Helping Symptoms:  yes       Wt Readings from Last 4 Encounters:   01/26/21 64.4 kg (142 lb)   01/21/20 63.4 kg (139 lb 12.8 oz)   10/24/19 62.6 kg (138 lb)   07/19/19 62.6 kg (138 lb)     Patient presents for ADHD follow-up visit.  No reported side effects, sleeping well, no weight loss, no appetite changes or palpitations, at goal in terms of productivity.       Review of Systems   Constitutional, HEENT, cardiovascular, pulmonary, gi and gu systems are negative, except as otherwise noted.      Objective    /84   Pulse 85   Temp 98  F (36.7  C) (Oral)   Ht 1.803 m (5' 11\")   Wt 64.4 kg (142 lb)   SpO2 100%   BMI 19.80 kg/m    Body mass index is 19.8 kg/m .  Physical Exam   GENERAL: healthy, alert and no distress  EYES: Eyes grossly normal to inspection, PERRL and conjunctivae and sclerae normal  NECK: no adenopathy, no asymmetry, masses, or scars and thyroid normal to palpation  SKIN: no suspicious lesions or rashes  PSYCH: mentation appears normal, affect normal/bright          "

## 2021-01-26 NOTE — LETTER
[facility]  01/26/21    Patient: Rose Everett  YOB: 1995  Medical Record Number: [id number]  CSN: [id number]                                                                              Non-opioid Controlled Substance Agreement    I understand that my care provider has prescribed a controlled substance to help manage my condition(s). I am taking this medicine to help me function or work. I know this is strong medicine, and that it can cause serious side effects. Controlled substances can be sedating, addicting and may cause a dependency on the drug. They can affect my ability to drive or think, and cause depression. They need to be taken exactly as prescribed. Combining controlled substances with certain medicines or chemicals (such as cocaine, sedatives and tranquilizers, sleeping pills, meth) can be dangerous or even fatal. Also, if I stop controlled substances suddenly, I may have severe withdrawal symptoms.  If not helpful, I may be asked to stop them.    The risks, benefits, and side effects of these medicine(s) were explained to me. I agree that:    1. I will take part in other treatments as advised by my care team. This may be psychiatry or counseling, physical therapy, behavioral therapy, group treatment or a referral to a pain clinic. I will reduce or stop my medicine when my care team tells me to do so.  2. I will take my medicines as prescribed. I will not change the dose or schedule unless my care team tells me to. There will be no refills if I  run out early.   I may be contactedwithout warning and asked to complete a urine drug test or pill count at any time.   3. I will keep all my appointments, and understand this is part of the monitoring of controlled substances. My care team may require an office visit for EVERY controlled substance refill. If I miss appointments or don t follow instructions, my care team may stop my medicine.  4. I will not ask other  providers to prescribe controlled substances, and I will not accept controlled substances from other people. If I need another prescribed controlled substance for a new reason, I will tell my care team within 1 business day.  5. I will use one pharmacy to fill all of my controlled substance prescriptions, and it is up to me to make sure that I do not run out of my medicines on weekends or holidays. If my care team is willing to refill my controlled substance prescription without a visit, I must request refills only during office hours, refills may take up to 3 days to process, and it may take up to 5 to 7 days for my medicine to be mailed and ready at my pharmacy. Prescriptions will not be mailed anywhere except my pharmacy.    6. I am responsible for my prescriptions. If the medicine/prescription is lost or stolen, it will not be replaced. I also agree not to share controlled substance medicines with anyone.              [facility]  01/26/21  Patient:  Rose Everett  YOB: 1995  Medical Record Number: [id number]  [facility]: [id number]    7. I agree to not use ANY illegal or recreational drugs. This includes marijuana, cocaine, bath salts or other drugs. I agree not to use alcohol unless my care team says I may. I agree to give urine samples whenever asked. If I don t give a urine sample, the care team may stop my medicine.    8. If I enroll in the [place] Medical Marijuana program, I will tell my care team. I will also sign an agreement to share my medical records with my care team.    9. I will bring in my list of medicines (or my medicine bottles) each time I come to the clinic.   10. I will tell my care team right away if I become pregnant or have a new medical problem treated outside of my regular clinic.  11. I understand that this medicine can affect my thinking and judgment. It may be unsafe for me to drive, use machinery and do dangerous tasks. I will not do any of these  things until I know how the medicine affects me. If my dose changes, I will wait to see how it affects me. I will contact my care team if I have concerns about medicine side effects.    I understand that if I do not follow any of the conditions above, my prescriptions or treatment may be stopped.      I agree that my provider, clinic care team, and pharmacy may work with any city, state or federal law enforcement agency that investigates the misuse, sale, or other diversion of my controlled medicine. I will allow my provider to discuss my care with or share a copy of this agreement with any other treating provider, pharmacy or emergency room where I receive care. I agree to give up (waive) any right of privacy or confidentiality with respect to these consents.   I have read this agreement and have asked questions about anything I did not understand.    ____________________________________________________    ____________  ________  Patient signature                                                         Date      Time    ____________________________________________________     ____________  ________  Witness                                                          Date      Time    ____________________________________________________  Provider signature

## 2021-03-23 DIAGNOSIS — F90.9 ATTENTION DEFICIT HYPERACTIVITY DISORDER (ADHD), UNSPECIFIED ADHD TYPE: ICD-10-CM

## 2021-03-23 NOTE — TELEPHONE ENCOUNTER
Reason for call:  Medication   If this is a refill request, has the caller requested the refill from the pharmacy already? No  Will the patient be using a [facility]? No  Name of the pharmacy and phone number for the current request: [id number] - [place], [place] - [address] AT [facility] [address]     Name of the medication requested: amphetamine-dextroamphetamine (ADDERALL) 20 MG tablet    Other request: patient will be out of town for the next week or two as he was visiting family and his car broke down. Could you send over a script to the pharmacy in [place]?    Outpatient Medication Detail     Disp Refills Start End STEFF   amphetamine-dextroamphetamine (ADDERALL) 20 MG tablet 60 tablet 0 3/29/2021 4/28/2021 --   Sig - Route: Take 1 tablet (20 mg) by mouth 2 times daily -      Was sent to [id number] - [place], [place] - [address] AT [facility] & [address] originally with a start date of 3/29/2021    Phone number to reach patient:  Cell number on file:    Telephone Information:   Mobile [phone]   Best Time:      Can we leave a detailed message on this number?  YES    Travel screening: Not Applicable

## 2021-03-24 RX ORDER — DEXTROAMPHETAMINE SACCHARATE, AMPHETAMINE ASPARTATE, DEXTROAMPHETAMINE SULFATE AND AMPHETAMINE SULFATE 5; 5; 5; 5 MG/1; MG/1; MG/1; MG/1
20 TABLET ORAL 2 TIMES DAILY
Qty: 60 TABLET | Refills: 0 | Status: SHIPPED | OUTPATIENT
Start: 2021-03-24 | End: 2021-09-29

## 2021-05-10 DIAGNOSIS — F90.9 ATTENTION DEFICIT HYPERACTIVITY DISORDER (ADHD), UNSPECIFIED ADHD TYPE: ICD-10-CM

## 2021-05-10 RX ORDER — DEXTROAMPHETAMINE SACCHARATE, AMPHETAMINE ASPARTATE, DEXTROAMPHETAMINE SULFATE AND AMPHETAMINE SULFATE 5; 5; 5; 5 MG/1; MG/1; MG/1; MG/1
20 TABLET ORAL 2 TIMES DAILY
Qty: 60 TABLET | Refills: 0 | Status: CANCELLED | OUTPATIENT
Start: 2021-05-10

## 2021-05-11 NOTE — TELEPHONE ENCOUNTER
Routing refill request to provider for review/approval because:  Drug not on the [facility] refill protocol   Requested Prescriptions   Pending Prescriptions Disp Refills     amphetamine-dextroamphetamine (ADDERALL) 20 MG tablet 60 tablet 0     Sig: Take 1 tablet (20 mg) by mouth 2 times daily       There is no refill protocol information for this order

## 2021-05-17 RX ORDER — DEXTROAMPHETAMINE SACCHARATE, AMPHETAMINE ASPARTATE, DEXTROAMPHETAMINE SULFATE AND AMPHETAMINE SULFATE 5; 5; 5; 5 MG/1; MG/1; MG/1; MG/1
20 TABLET ORAL 2 TIMES DAILY
Qty: 60 TABLET | Refills: 0 | Status: SHIPPED | OUTPATIENT
Start: 2021-05-17 | End: 2021-06-16

## 2021-05-17 RX ORDER — DEXTROAMPHETAMINE SACCHARATE, AMPHETAMINE ASPARTATE, DEXTROAMPHETAMINE SULFATE AND AMPHETAMINE SULFATE 5; 5; 5; 5 MG/1; MG/1; MG/1; MG/1
20 TABLET ORAL 2 TIMES DAILY
Qty: 60 TABLET | Refills: 0 | Status: SHIPPED | OUTPATIENT
Start: 2021-06-17 | End: 2021-07-17

## 2021-05-17 RX ORDER — DEXTROAMPHETAMINE SACCHARATE, AMPHETAMINE ASPARTATE, DEXTROAMPHETAMINE SULFATE AND AMPHETAMINE SULFATE 5; 5; 5; 5 MG/1; MG/1; MG/1; MG/1
20 TABLET ORAL 2 TIMES DAILY
Qty: 60 TABLET | Refills: 0 | Status: SHIPPED | OUTPATIENT
Start: 2021-07-18 | End: 2021-08-17

## 2021-08-02 DIAGNOSIS — F90.9 ATTENTION DEFICIT HYPERACTIVITY DISORDER (ADHD), UNSPECIFIED ADHD TYPE: ICD-10-CM

## 2021-08-02 RX ORDER — DEXTROAMPHETAMINE SACCHARATE, AMPHETAMINE ASPARTATE, DEXTROAMPHETAMINE SULFATE AND AMPHETAMINE SULFATE 5; 5; 5; 5 MG/1; MG/1; MG/1; MG/1
20 TABLET ORAL 2 TIMES DAILY
Qty: 60 TABLET | Refills: 0 | Status: CANCELLED | OUTPATIENT
Start: 2021-08-02

## 2021-08-02 NOTE — TELEPHONE ENCOUNTER
Ph. [phone]    Patient calling from out of state requesting refill.    Pharmacy t'd up    Brittanie Aldana

## 2021-08-03 RX ORDER — DEXTROAMPHETAMINE SACCHARATE, AMPHETAMINE ASPARTATE, DEXTROAMPHETAMINE SULFATE AND AMPHETAMINE SULFATE 5; 5; 5; 5 MG/1; MG/1; MG/1; MG/1
20 TABLET ORAL 2 TIMES DAILY
Qty: 60 TABLET | Refills: 0 | Status: SHIPPED | OUTPATIENT
Start: 2021-10-04 | End: 2021-09-29

## 2021-08-03 RX ORDER — DEXTROAMPHETAMINE SACCHARATE, AMPHETAMINE ASPARTATE, DEXTROAMPHETAMINE SULFATE AND AMPHETAMINE SULFATE 5; 5; 5; 5 MG/1; MG/1; MG/1; MG/1
20 TABLET ORAL 2 TIMES DAILY
Qty: 60 TABLET | Refills: 0 | Status: SHIPPED | OUTPATIENT
Start: 2021-08-03 | End: 2021-09-02

## 2021-08-03 RX ORDER — DEXTROAMPHETAMINE SACCHARATE, AMPHETAMINE ASPARTATE, DEXTROAMPHETAMINE SULFATE AND AMPHETAMINE SULFATE 5; 5; 5; 5 MG/1; MG/1; MG/1; MG/1
20 TABLET ORAL 2 TIMES DAILY
Qty: 60 TABLET | Refills: 0 | Status: SHIPPED | OUTPATIENT
Start: 2021-09-03 | End: 2021-09-29

## 2021-08-03 NOTE — TELEPHONE ENCOUNTER
Routing refill request to provider for review/approval because:  Drug not on the [facility] refill protocol           Pending Prescriptions:                       Disp   Refills    amphetamine-dextroamphetamine (ADDERALL) 2*60 tab*0        Sig: Take 1 tablet (20 mg) by mouth 2 times daily        Mando Serrato RN

## 2021-08-23 ENCOUNTER — TELEPHONE (OUTPATIENT)
Dept: FAMILY MEDICINE | Facility: CLINIC | Age: 26
End: 2021-08-23
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
## 2021-08-24 ENCOUNTER — TELEPHONE (OUTPATIENT)
Dept: FAMILY MEDICINE | Facility: CLINIC | Age: 26
End: 2021-08-24

## 2021-08-24 NOTE — TELEPHONE ENCOUNTER
November 25, 2019    Matthieu Nunes  [address]  [place] [address]    Dear ,  This letter is regarding your recent Pap smear (cervical cancer screening) and Human Papillomavirus (HPV) test.  We are happy to inform you that your Pap smear result is normal. Cervical cancer is closely linked with certain types of HPV. Your results showed no evidence of high-risk HPV.  We recommend you have your next PAP smear and HPV test in 5 years.  You will still need to return to the clinic every year for an annual exam and other preventive tests.  If you have additional questions regarding this result, please call our registered nurse, Giovana at [phone].  Sincerely,    Carly Gold PA-C/connor    Reason for Call:  Medication or medication refill:    Do you use a [facility]?  Name of the pharmacy and phone number for the current request:  [facility] [phone]    Name of the medication requested: Adderall    Other request: Shows it was approved but still not at pharmacy, patient would like to pick it up tomorrow as soon as possible. Please call patient once complete    Can we leave a detailed message on this number? NO    Phone number patient can be reached at: Home number on file [phone] (home)    Best Time: right away    Call taken on 8/24/2021 at 5:53 PM by Madina Lopez

## 2021-08-25 NOTE — TELEPHONE ENCOUNTER
Called and spoke with pharmacist at [facility] in [place]. Gave verbal ok for early refill. They do not need a new script as they have older scripts on file that can be used. They will fill rx. Pt notified.    Luz Pope RN  [facility]

## 2021-09-20 ENCOUNTER — TELEPHONE (OUTPATIENT)
Dept: FAMILY MEDICINE | Facility: CLINIC | Age: 26
End: 2021-09-20

## 2021-09-20 DIAGNOSIS — F90.9 ATTENTION DEFICIT HYPERACTIVITY DISORDER (ADHD), UNSPECIFIED ADHD TYPE: ICD-10-CM

## 2021-09-21 NOTE — TELEPHONE ENCOUNTER
Requested Prescriptions   Pending Prescriptions Disp Refills     amphetamine-dextroamphetamine (ADDERALL) 20 MG tablet 60 tablet 0     Sig: Take 1 tablet (20 mg) by mouth 2 times daily       There is no refill protocol information for this order        Routing refill request to provider for review/approval because:  Drug not on the [facility] refill protocol

## 2021-09-22 RX ORDER — DEXTROAMPHETAMINE SACCHARATE, AMPHETAMINE ASPARTATE, DEXTROAMPHETAMINE SULFATE AND AMPHETAMINE SULFATE 5; 5; 5; 5 MG/1; MG/1; MG/1; MG/1
20 TABLET ORAL 2 TIMES DAILY
Qty: 60 TABLET | Refills: 0 | OUTPATIENT
Start: 2021-10-04

## 2021-09-27 ENCOUNTER — TELEPHONE (OUTPATIENT)
Dept: FAMILY MEDICINE | Facility: CLINIC | Age: 26
End: 2021-09-27

## 2021-09-27 ENCOUNTER — VIRTUAL VISIT (OUTPATIENT)
Dept: FAMILY MEDICINE | Facility: CLINIC | Age: 26
End: 2021-09-27

## 2021-09-27 DIAGNOSIS — F90.9 ATTENTION DEFICIT HYPERACTIVITY DISORDER (ADHD), UNSPECIFIED ADHD TYPE: ICD-10-CM

## 2021-09-27 DIAGNOSIS — F90.9 ATTENTION DEFICIT HYPERACTIVITY DISORDER (ADHD), UNSPECIFIED ADHD TYPE: Primary | ICD-10-CM

## 2021-09-27 PROCEDURE — 99213 OFFICE O/P EST LOW 20 MIN: CPT | Mod: 95 | Performed by: FAMILY MEDICINE

## 2021-09-27 RX ORDER — DEXTROAMPHETAMINE SACCHARATE, AMPHETAMINE ASPARTATE MONOHYDRATE, DEXTROAMPHETAMINE SULFATE AND AMPHETAMINE SULFATE 5; 5; 5; 5 MG/1; MG/1; MG/1; MG/1
20 CAPSULE, EXTENDED RELEASE ORAL 2 TIMES DAILY
Qty: 60 CAPSULE | Refills: 0 | Status: SHIPPED | OUTPATIENT
Start: 2021-10-28 | End: 2021-09-29

## 2021-09-27 RX ORDER — DEXTROAMPHETAMINE SACCHARATE, AMPHETAMINE ASPARTATE MONOHYDRATE, DEXTROAMPHETAMINE SULFATE AND AMPHETAMINE SULFATE 5; 5; 5; 5 MG/1; MG/1; MG/1; MG/1
20 CAPSULE, EXTENDED RELEASE ORAL 2 TIMES DAILY
Qty: 60 CAPSULE | Refills: 0 | Status: SHIPPED | OUTPATIENT
Start: 2021-11-28 | End: 2021-09-29

## 2021-09-27 RX ORDER — DEXTROAMPHETAMINE SACCHARATE, AMPHETAMINE ASPARTATE MONOHYDRATE, DEXTROAMPHETAMINE SULFATE AND AMPHETAMINE SULFATE 5; 5; 5; 5 MG/1; MG/1; MG/1; MG/1
20 CAPSULE, EXTENDED RELEASE ORAL 2 TIMES DAILY
Qty: 60 CAPSULE | Refills: 0 | Status: SHIPPED | OUTPATIENT
Start: 2021-09-27 | End: 2021-09-29

## 2021-09-27 NOTE — TELEPHONE ENCOUNTER
Patient picked up his Adderall usually gets istant release and this was extended did not discuss in appt. Please call he said extended does not work.  Kate Henderson,

## 2021-09-27 NOTE — TELEPHONE ENCOUNTER
Patient called the clinic.  He reports that he usually is prescribed the amphetamine-dextroamphetamine (ADDERALL) 20 MG tablet.    amphetamine-dextroamphetamine (ADDERALL XR) 20 MG 24 hr capsule: patient reports that he does not sleep when taking the XR.    Patient is requesting a new prescription for the amphetamine-dextroamphetamine (ADDERALL) 20 MG tablet:  Sig - Route: Take 1 tablet (20 mg) by mouth 2 times daily - Oral

## 2021-09-27 NOTE — PROGRESS NOTES
Rose is a 26 year old who is being evaluated via a billable telephone visit.      What phone number would you like to be contacted at? [phone]  How would you like to obtain your AVS? Mail a copy    Assessment & Plan       ICD-10-CM    1. Attention deficit hyperactivity disorder (ADHD), unspecified ADHD type  F90.9 DISCONTINUED: amphetamine-dextroamphetamine (ADDERALL XR) 20 MG 24 hr capsule     DISCONTINUED: amphetamine-dextroamphetamine (ADDERALL XR) 20 MG 24 hr capsule     DISCONTINUED: amphetamine-dextroamphetamine (ADDERALL XR) 20 MG 24 hr capsule         Review of external notes as documented elsewhere in note      Follow-up in 6 months    Akbar Fajardo MD  [facility] BEBE Rose is a 26 year old who presents for the following health issues     HPI     Medication Followup of amphetamine-dextroamphetamine (ADDERALL) 20 MG tablet    Taking Medication as prescribed: yes    Side Effects:  None    Medication Helping Symptoms:  yes     Patient presents for ADHD follow-up visit.  No reported side effects, sleeping well, no weight loss, no appetite changes or palpitations, at goal in terms of productivity.       Review of Systems   Constitutional, HEENT, cardiovascular, pulmonary, gi and gu systems are negative, except as otherwise noted.      Objective           Vitals:  No vitals were obtained today due to virtual visit.    Physical Exam   healthy, alert and no distress  PSYCH: Alert and oriented times 3; coherent speech, normal   rate and volume, able to articulate logical thoughts, able   to abstract reason, no tangential thoughts, no hallucinations   or delusions  His affect is normal  RESP: No cough, no audible wheezing, able to talk in full sentences  Remainder of exam unable to be completed due to telephone visits                Phone call duration: 5 minutes

## 2021-09-28 RX ORDER — DEXTROAMPHETAMINE SACCHARATE, AMPHETAMINE ASPARTATE, DEXTROAMPHETAMINE SULFATE AND AMPHETAMINE SULFATE 5; 5; 5; 5 MG/1; MG/1; MG/1; MG/1
20 TABLET ORAL 2 TIMES DAILY
Qty: 60 TABLET | Refills: 0 | Status: CANCELLED | OUTPATIENT
Start: 2021-09-28

## 2021-09-28 NOTE — TELEPHONE ENCOUNTER
Patient called again requesting an update on this.    Routing urgently to PCP to please address this. Thank you!    ELIJAH QuevedoN LICO  [facility], [place]

## 2021-09-29 ENCOUNTER — TELEPHONE (OUTPATIENT)
Dept: FAMILY MEDICINE | Facility: CLINIC | Age: 26
End: 2021-09-29

## 2021-09-29 DIAGNOSIS — F90.9 ATTENTION DEFICIT HYPERACTIVITY DISORDER (ADHD), UNSPECIFIED ADHD TYPE: ICD-10-CM

## 2021-09-29 DIAGNOSIS — F90.9 ATTENTION DEFICIT HYPERACTIVITY DISORDER (ADHD), UNSPECIFIED ADHD TYPE: Primary | ICD-10-CM

## 2021-09-29 RX ORDER — DEXTROAMPHETAMINE SACCHARATE, AMPHETAMINE ASPARTATE, DEXTROAMPHETAMINE SULFATE AND AMPHETAMINE SULFATE 5; 5; 5; 5 MG/1; MG/1; MG/1; MG/1
20 TABLET ORAL 2 TIMES DAILY
Qty: 60 TABLET | Refills: 0 | Status: SHIPPED | OUTPATIENT
Start: 2021-10-04 | End: 2021-10-01

## 2021-09-29 NOTE — TELEPHONE ENCOUNTER
Pt calling to check on status of message. States he has been without medication for 1 week. He cannot take the XR form of medication. Work has been difficult without the medication. Routing to [facility] to advise. Dr. Fajardo is out of office today.    Luz Pope RN  [facility]

## 2021-09-29 NOTE — TELEPHONE ENCOUNTER
Patient called because his refill date should have been on the 9/27/21, but RX was ordered as XR which patient does not tolerate well. Regular Adderall 20 mg ordered but date says 10/04/21 which means patient will be without for a week. Patient says his is trying to get promotion at work and does not want to mess things up.  Please call at [phone]  Betsey Hewitt RN on 9/29/2021 at 1:20 PM

## 2021-09-29 NOTE — TELEPHONE ENCOUNTER
Signed Prescriptions:                        Disp   Refills    amphetamine-dextroamphetamine (ADDERALL) 2*60 tab*0        Sig: Take 1 tablet (20 mg) by mouth 2 times daily  Authorizing Provider: VERONICA CABRERA

## 2021-09-30 NOTE — TELEPHONE ENCOUNTER
Patient calling again regarding incorrect date on his Adderall prescription.    Please see previous message.    Andres Harding RN, BSN, PHN  [facility]

## 2021-10-01 RX ORDER — DEXTROAMPHETAMINE SACCHARATE, AMPHETAMINE ASPARTATE, DEXTROAMPHETAMINE SULFATE AND AMPHETAMINE SULFATE 5; 5; 5; 5 MG/1; MG/1; MG/1; MG/1
20 TABLET ORAL 2 TIMES DAILY
Qty: 60 TABLET | Refills: 0 | Status: SHIPPED | OUTPATIENT
Start: 2021-12-02 | End: 2022-01-01

## 2021-10-01 RX ORDER — DEXTROAMPHETAMINE SACCHARATE, AMPHETAMINE ASPARTATE, DEXTROAMPHETAMINE SULFATE AND AMPHETAMINE SULFATE 5; 5; 5; 5 MG/1; MG/1; MG/1; MG/1
20 TABLET ORAL 2 TIMES DAILY
Qty: 60 TABLET | Refills: 0 | Status: SHIPPED | OUTPATIENT
Start: 2021-10-01 | End: 2021-10-31

## 2021-10-01 RX ORDER — DEXTROAMPHETAMINE SACCHARATE, AMPHETAMINE ASPARTATE, DEXTROAMPHETAMINE SULFATE AND AMPHETAMINE SULFATE 5; 5; 5; 5 MG/1; MG/1; MG/1; MG/1
20 TABLET ORAL 2 TIMES DAILY
Qty: 60 TABLET | Refills: 0 | Status: SHIPPED | OUTPATIENT
Start: 2021-11-01 | End: 2021-12-01

## 2021-10-04 NOTE — TELEPHONE ENCOUNTER
Patient notified that new rx is on file. Patient verbalized good understanding and will pick it up from pharmacy.     Gia NAVAN, RN  [facility]

## 2021-12-16 NOTE — TELEPHONE ENCOUNTER
Received papers put in providers bin.  Kate Victoria  Team Beatriz,      [Dear  ___] : Dear  [unfilled], [Courtesy Letter:] : I had the pleasure of seeing your patient, [unfilled], in my office today. [Please see my note below.] : Please see my note below. [Consult Closing:] : Thank you very much for allowing me to participate in the care of this patient.  If you have any questions, please do not hesitate to contact me. [Sincerely,] : Sincerely, [DrKelly  ___] : Dr. RUELAS [___] : [unfilled] [FreeTextEntry1] : Enclosed please find my operative report, pathology report and postop visit note.  [FreeTextEntry3] : La Vides MD

## 2022-01-26 DIAGNOSIS — F90.9 ATTENTION DEFICIT HYPERACTIVITY DISORDER (ADHD), UNSPECIFIED ADHD TYPE: ICD-10-CM

## 2022-01-26 RX ORDER — DEXTROAMPHETAMINE SACCHARATE, AMPHETAMINE ASPARTATE, DEXTROAMPHETAMINE SULFATE AND AMPHETAMINE SULFATE 5; 5; 5; 5 MG/1; MG/1; MG/1; MG/1
20 TABLET ORAL 2 TIMES DAILY
Qty: 60 TABLET | Refills: 0 | Status: CANCELLED | OUTPATIENT
Start: 2022-01-26

## 2022-01-26 NOTE — TELEPHONE ENCOUNTER
Reason for Call:  Medication or medication refill:    Do you use a [facility]?  Name of the pharmacy and phone number for the current request:  [facility] in [place]    Name of the medication requested: amphetamine-dextroamphetamine (ADDERALL) 20 MG tablet    Can we leave a detailed message on this number? YES    Phone number patient can be reached at: Cell number on file:    Telephone Information:   Mobile [phone]     Best Time: anytime    Call taken on 1/26/2022 at 10:20 AM by Pearl Lemos

## 2022-01-27 RX ORDER — DEXTROAMPHETAMINE SACCHARATE, AMPHETAMINE ASPARTATE, DEXTROAMPHETAMINE SULFATE AND AMPHETAMINE SULFATE 5; 5; 5; 5 MG/1; MG/1; MG/1; MG/1
20 TABLET ORAL 2 TIMES DAILY
Qty: 60 TABLET | Refills: 0 | Status: SHIPPED | OUTPATIENT
Start: 2022-02-27 | End: 2022-03-29

## 2022-01-27 RX ORDER — DEXTROAMPHETAMINE SACCHARATE, AMPHETAMINE ASPARTATE, DEXTROAMPHETAMINE SULFATE AND AMPHETAMINE SULFATE 5; 5; 5; 5 MG/1; MG/1; MG/1; MG/1
20 TABLET ORAL 2 TIMES DAILY
Qty: 60 TABLET | Refills: 0 | Status: SHIPPED | OUTPATIENT
Start: 2022-03-30 | End: 2022-05-19

## 2022-01-27 RX ORDER — DEXTROAMPHETAMINE SACCHARATE, AMPHETAMINE ASPARTATE, DEXTROAMPHETAMINE SULFATE AND AMPHETAMINE SULFATE 5; 5; 5; 5 MG/1; MG/1; MG/1; MG/1
20 TABLET ORAL 2 TIMES DAILY
Qty: 60 TABLET | Refills: 0 | Status: SHIPPED | OUTPATIENT
Start: 2022-01-27 | End: 2022-02-26

## 2022-01-27 NOTE — TELEPHONE ENCOUNTER
Patient calling to check status of refill.  Tomorrow is his last dose so needs prescription sent in ASAP please.    Pearl Lemos,

## 2022-01-28 DIAGNOSIS — F90.9 ATTENTION DEFICIT HYPERACTIVITY DISORDER (ADHD), UNSPECIFIED ADHD TYPE: ICD-10-CM

## 2022-01-28 RX ORDER — DEXTROAMPHETAMINE SACCHARATE, AMPHETAMINE ASPARTATE, DEXTROAMPHETAMINE SULFATE AND AMPHETAMINE SULFATE 5; 5; 5; 5 MG/1; MG/1; MG/1; MG/1
20 TABLET ORAL 2 TIMES DAILY
Qty: 60 TABLET | Refills: 0 | OUTPATIENT
Start: 2022-01-28

## 2022-01-28 NOTE — TELEPHONE ENCOUNTER
Patient called in. He was informed this was sent to the pharmacy. He will contact his pharmacy. Aileen Baird,

## 2022-01-28 NOTE — TELEPHONE ENCOUNTER
Pt called and stated that they will be out of medications in the next few days.       Elissa LAWSON MA     Telephone Encounter by Ilda Alberto MA at 07/05/18 10:43 AM     Author:  Ilda Alberto MA Service:  (none) Author Type:  Certified Medical Assistant     Filed:  07/05/18 10:51 AM Encounter Date:  7/5/2018 Status:  Signed     :  Ilda Alberto MA (Certified Medical Assistant)            Spoke with Sarah and informed her that the last time she was prescribed Cipro was in 2008 and had to discontinue due to sleeplessness. Since then she has only been prescribed Macrobid. Advised Sarah to continue antibiotics until completed and should increase her fluids. Also try to eat even though she does not have an appetite. If her symptoms continue or worsen, she should contact her PCP. .[JD1.1M]Sarah verbalized understanding of information given.[JD1.1T]        Revision History        User Key Date/Time User Provider Type Action    > JD1.1 07/05/18 10:51 AM Ilda Alberto MA Certified Medical Assistant Sign    M - Manual, T - Template

## 2022-05-17 DIAGNOSIS — F90.9 ATTENTION DEFICIT HYPERACTIVITY DISORDER (ADHD), UNSPECIFIED ADHD TYPE: ICD-10-CM

## 2022-05-17 RX ORDER — DEXTROAMPHETAMINE SACCHARATE, AMPHETAMINE ASPARTATE, DEXTROAMPHETAMINE SULFATE AND AMPHETAMINE SULFATE 5; 5; 5; 5 MG/1; MG/1; MG/1; MG/1
TABLET ORAL
Qty: 60 TABLET | Refills: 0 | Status: CANCELLED | OUTPATIENT
Start: 2022-05-17 | End: 2022-08-15

## 2022-05-17 NOTE — PROGRESS NOTES
Patient called the clinic requesting refills for amphetamine-dextroamphetamine (ADDERALL) 20 MG tablet.    Patient has 2 doses left from previous refills as he filled it on 4/19/22.    Requested Prescriptions   Pending Prescriptions Disp Refills     amphetamine-dextroamphetamine (ADDERALL) 20 MG tablet 60 tablet 0     Sig: Take 1 tablet (20 mg) by mouth 2 times daily for 30 days, THEN 1 tablet (20 mg) 2 times daily for 30 days, THEN 1 tablet (20 mg) 2 times daily.       There is no refill protocol information for this order        Routing refill request to provider for review/approval because:  Drug not on the [facility] refill protocol          English

## 2022-05-18 DIAGNOSIS — F90.9 ATTENTION DEFICIT HYPERACTIVITY DISORDER (ADHD), UNSPECIFIED ADHD TYPE: ICD-10-CM

## 2022-05-18 NOTE — TELEPHONE ENCOUNTER
Patient calling in to check on a refill request from yesterday. He is requesting his amphetamine-dextroamphetamine (ADDERALL) 20 MG tablet. Nurse was unable to add to the orders encounter for this medication.    Patient has tomorrows dose remaining and request this be refilled today if possible. PCP is not in clinic will route to covering providers as well as the PCP.     Patient would like a call back when this is refilled and gave permission for a detailed message to be left if needed.     Routing refill request to provider for review/approval because:  Drug not on the [facility] refill protocol     Thank you,   Candie Elias RN  [facility], [place]

## 2022-05-18 NOTE — TELEPHONE ENCOUNTER
Reason for call:  Medication   If this is a refill request, has the caller requested the refill from the pharmacy already? Yes  Will the patient be using a [facility]? No  Name of the pharmacy and phone number for the current request: [id number] - [facility], [place] - [address]      Name of the medication requested: amphetamine-dextroamphetamine (ADDERALL) 20 MG tablet    Other request: patient will be taking the last dosage tomorrow. Please send over ASA    Next 5 appointments (look out 90 days)    May 23, 2022  1:30 PM  (Arrive by 1:10 PM)  Provider Visit with Akbar Mensah MD  [facility] ([facility] [place] ) [address] [place] [address]  [place] [address] [phone]        Phone number to reach patient:  Cell number on file:    Telephone Information:   Mobile [phone]   Best Time:      Can we leave a detailed message on this number?  YES    Travel screening: Not Applicable

## 2022-05-19 RX ORDER — DEXTROAMPHETAMINE SACCHARATE, AMPHETAMINE ASPARTATE, DEXTROAMPHETAMINE SULFATE AND AMPHETAMINE SULFATE 5; 5; 5; 5 MG/1; MG/1; MG/1; MG/1
20 TABLET ORAL 2 TIMES DAILY
Qty: 60 TABLET | Refills: 0 | Status: SHIPPED | OUTPATIENT
Start: 2022-05-19 | End: 2022-06-07

## 2022-05-19 RX ORDER — DEXTROAMPHETAMINE SACCHARATE, AMPHETAMINE ASPARTATE, DEXTROAMPHETAMINE SULFATE AND AMPHETAMINE SULFATE 5; 5; 5; 5 MG/1; MG/1; MG/1; MG/1
20 TABLET ORAL 2 TIMES DAILY
Qty: 60 TABLET | Refills: 0 | OUTPATIENT
Start: 2022-05-19

## 2022-05-19 NOTE — PROGRESS NOTES
Needs 6 month ADHD visit.  He can reschedule to same-day or provider approval slot if needs to make appointment earlier.    Akbar Fajardo MD

## 2022-05-19 NOTE — TELEPHONE ENCOUNTER
Dr. Mensah,     Patient scheduled visit 5/23/22. Ok for med refill?    Thanks,  LICO Wagner  [facility]

## 2022-05-19 NOTE — PROGRESS NOTES
Please see refill encounter sent to PCP. Closing this encounter.     Thanks,  LICO Wagner  [facility]

## 2022-05-19 NOTE — TELEPHONE ENCOUNTER
I called and spoke with the patient letting him know his prescription had been sent to the pharmacy and he had already picked it up and he does have an upcoming appointment scheduled.    Maria Del Carmen Rawls [facility]

## 2022-05-19 NOTE — TELEPHONE ENCOUNTER
Patient called again asking about his Adderall  Refill.   Patient is out of medication today and has scheduled his appointment.     Future Office Visit:   Next 5 appointments (look out 90 days)    May 23, 2022  1:30 PM  (Arrive by 1:10 PM)  Provider Visit with Akbar Mensah MD  [facility] ([facility] ) [address] [address]  [phone]         Prescription pended.     Mery Hilliard RN BSN  [facility]

## 2022-05-23 ENCOUNTER — OFFICE VISIT (OUTPATIENT)
Dept: FAMILY MEDICINE | Facility: CLINIC | Age: 27
End: 2022-05-23

## 2022-05-23 VITALS
OXYGEN SATURATION: 99 % | HEIGHT: 68 IN | HEART RATE: 66 BPM | SYSTOLIC BLOOD PRESSURE: 126 MMHG | BODY MASS INDEX: 20.19 KG/M2 | WEIGHT: 133.2 LBS | TEMPERATURE: 98.5 F | DIASTOLIC BLOOD PRESSURE: 78 MMHG

## 2022-05-23 DIAGNOSIS — Z23 NEED FOR VACCINATION: ICD-10-CM

## 2022-05-23 DIAGNOSIS — F90.9 ATTENTION DEFICIT HYPERACTIVITY DISORDER (ADHD), UNSPECIFIED ADHD TYPE: Primary | ICD-10-CM

## 2022-05-23 PROCEDURE — 99214 OFFICE O/P EST MOD 30 MIN: CPT | Mod: 25 | Performed by: FAMILY MEDICINE

## 2022-05-23 PROCEDURE — 90715 TDAP VACCINE 7 YRS/> IM: CPT | Performed by: FAMILY MEDICINE

## 2022-05-23 PROCEDURE — 90471 IMMUNIZATION ADMIN: CPT | Performed by: FAMILY MEDICINE

## 2022-05-23 RX ORDER — DEXTROAMPHETAMINE SACCHARATE, AMPHETAMINE ASPARTATE, DEXTROAMPHETAMINE SULFATE AND AMPHETAMINE SULFATE 5; 5; 5; 5 MG/1; MG/1; MG/1; MG/1
20 TABLET ORAL 2 TIMES DAILY
Qty: 60 TABLET | Refills: 0 | Status: CANCELLED | OUTPATIENT
Start: 2022-05-23

## 2022-05-23 RX ORDER — DEXTROAMPHETAMINE SACCHARATE, AMPHETAMINE ASPARTATE, DEXTROAMPHETAMINE SULFATE AND AMPHETAMINE SULFATE 5; 5; 5; 5 MG/1; MG/1; MG/1; MG/1
20 TABLET ORAL 2 TIMES DAILY
Qty: 60 TABLET | Refills: 0 | Status: SHIPPED | OUTPATIENT
Start: 2022-06-18 | End: 2022-07-18

## 2022-05-23 RX ORDER — DEXTROAMPHETAMINE SACCHARATE, AMPHETAMINE ASPARTATE, DEXTROAMPHETAMINE SULFATE AND AMPHETAMINE SULFATE 5; 5; 5; 5 MG/1; MG/1; MG/1; MG/1
20 TABLET ORAL 2 TIMES DAILY
Qty: 60 TABLET | Refills: 0 | Status: SHIPPED | OUTPATIENT
Start: 2022-07-18 | End: 2022-07-26

## 2022-05-23 RX ORDER — DEXTROAMPHETAMINE SACCHARATE, AMPHETAMINE ASPARTATE, DEXTROAMPHETAMINE SULFATE AND AMPHETAMINE SULFATE 5; 5; 5; 5 MG/1; MG/1; MG/1; MG/1
20 TABLET ORAL 2 TIMES DAILY
Qty: 60 TABLET | Refills: 0 | Status: SHIPPED | OUTPATIENT
Start: 2022-08-17 | End: 2022-07-26

## 2022-05-23 NOTE — LETTER
[facility]  05/23/22  Patient: Rose Everett  YOB: 1995  Medical Record Number: [id number]                                                                                  Non-Opioid Controlled Substance Agreement    This is an agreement between you and your provider regarding safe and appropriate use of controlled substances prescribed by your care team. Controlled substances are?medicines that can cause physical and mental dependence (abuse).     There are strict laws about having and using these medicines. We here at [facility] are  committed to working with you in your efforts to get better. To support you in this work, we'll help you schedule regular office appointments for medicine refills. If we must cancel or change your appointment for any reason, we'll make sure you have enough medicine to last until your next appointment.     As a Provider, I will:     Listen carefully to your concerns while treating you with respect.     Recommend a treatment plan that I believe is in your best interest and may involve therapies other than medicine.      Talk with you often about the possible benefits and the risk of harm of any medicine that we prescribe for you.    Assess the safety of this medicine and check how well it works.      Provide a plan on how to taper (discontinue or go off) using this medicine if the decision is made to stop its use.      ::  As a Patient, I understand controlled substances:       Are prescribed by my care provider to help me function or work and manage my condition(s).?    Are strong medicines and can cause serious side effects.       Need to be taken exactly as prescribed.?Combining controlled substances with certain medicines or chemicals (such as illegal drugs, alcohol, sedatives, sleeping pills, and benzodiazepines) can be dangerous or even fatal.? If I stop taking my medicines suddenly, I may have severe withdrawal symptoms.     The  risks, benefits, and side effects of these medicine(s) were explained to me. I agree that:    1. I will take part in other treatments as advised by my care team. This may be psychiatry or counseling, physical therapy, behavioral therapy, group treatment or a referral to specialist.    2. I will keep all my appointments and understand this is part of the monitoring of controlled substances.?My care team may require an office visit for EVERY controlled substance refill. If I miss appointments or don t follow instructions, my care team may stop my medicine    3. I will take my medicines as prescribed. I will not change the dose or schedule unless my care team tells me to. There will be no refills if I run out early.      4. I may be asked to come to the clinic and complete a urine drug test or complete a pill count. If I don t give a urine sample or participate in a pill count, the care team may stop my medicine.    5. I will only receive controlled substance prescriptions from this clinic. If I am treated by another provider, I will tell them that I am taking controlled substances and that I have a treatment agreement with this provider. I will inform my [facility] care team within one business day if I am given a prescription for any controlled substance by another healthcare provider. My [facility] care team can contact other providers and pharmacists about my use of any medicines.    6. It is up to me to make sure that I don't run out of my medicines on weekends or holidays.?If my care team is willing to refill my prescription without a visit, I must request refills only during office hours. Refills may take up to 3 business days to process. I will use one pharmacy to fill all my controlled substance prescriptions. I will notify the clinic about any changes to my insurance or medicine availability.    7. I am responsible for my prescriptions. If the medicine/prescription is lost, stolen or destroyed,  it will not be replaced.?I also agree not to share controlled substance medicines with anyone.     8. I am aware I should not use any illegal or recreational drugs. I agree not to drink alcohol unless my care team says I can.     9. If I enroll in the [place] Medical Cannabis program, I will tell my care team before my next refill.    10. I will tell my care team right away if I become pregnant, have a new medical problem treated outside of my regular clinic, or have a change in my medicines.     11. I understand that this medicine can affect my thinking, judgment and reaction time.? Alcohol and drugs affect the brain and body, which can affect the safety of my driving. Being under the influence of alcohol or drugs can affect my decision-making, behaviors, personal safety and the safety of others. Driving while impaired (DWI) can occur if a person is driving, operating or in physical control of a car, motorcycle, boat, snowmobile, ATV, motorbike, off-road vehicle or any other motor vehicle ([place] Statute 169A.20). I understand the risk if I choose to drive or operate any vehicle or machinery.    I understand that if I do not follow any of the conditions above, my prescriptions or treatment may be stopped or changed.   I agree that my provider, clinic care team and pharmacy may work with any city, state or federal law enforcement agency that investigates the misuse, sale or other diversion of my controlled medicine. I will allow my provider to discuss my care with, or share a copy of, this agreement with any other treating provider, pharmacy or emergency room where I receive care.     I have read this agreement and have asked questions about anything I did not understand.    ________________________________________________________  Patient Signature - Rose Everett     ___________________                   Date     ________________________________________________________  Provider Signature - Akbar  MacIntire, MD       ___________________                   Date     ________________________________________________________  Witness Signature (required if provider not present while patient signing)          ___________________                   Date

## 2022-05-23 NOTE — PROGRESS NOTES
"  Assessment & Plan       ICD-10-CM    1. Attention deficit hyperactivity disorder (ADHD), unspecified ADHD type  F90.9 REVIEW OF HEALTH MAINTENANCE PROTOCOL ORDERS     amphetamine-dextroamphetamine (ADDERALL) 20 MG tablet     amphetamine-dextroamphetamine (ADDERALL) 20 MG tablet     amphetamine-dextroamphetamine (ADDERALL) 20 MG tablet   2. Need for vaccination  Z23 TDAP VACCINE (Adacel, Boostrix)  [[id number]]         Review of external notes as documented elsewhere in note      Return in about 6 months (around 11/23/2022) for For Re-Assessment.    Akbar Fajardo MD  [facility] BEBE Rose is a 27 year old who presents for the following health issues     History of Present Illness       Reason for visit:  Check up    He eats 2-3 servings of fruits and vegetables daily.He consumes 3 sweetened beverage(s) daily.He exercises with enough effort to increase his heart rate 9 or less minutes per day.  He exercises with enough effort to increase his heart rate 7 days per week.   He is taking medications regularly.     Medication Followup of amphetamine-dextroamphetamine (ADDERALL) 20 MG tablet    Taking Medication as prescribed: yes    Side Effects:  None    Medication Helping Symptoms:  yes     Patient presents for ADHD follow-up visit.  No reported side effects, sleeping well, no weight loss, no appetite changes or palpitations, at goal in terms of productivity.   Needs CSA today        Review of Systems   Constitutional, HEENT, cardiovascular, pulmonary, gi and gu systems are negative, except as otherwise noted.      Objective    /78   Pulse 66   Temp 98.5  F (36.9  C) (Oral)   Ht 1.73 m (5' 8.11\")   Wt 60.4 kg (133 lb 3.2 oz)   SpO2 99%   BMI 20.19 kg/m    Body mass index is 20.19 kg/m .  Physical Exam  Constitutional:       General: He is not in acute distress.     Appearance: Normal appearance. He is well-developed.   HENT:      Head: Normocephalic and atraumatic.      " Right Ear: External ear normal.      Left Ear: External ear normal.   Eyes:      General: No scleral icterus.     Conjunctiva/sclera: Conjunctivae normal.   Pulmonary:      Effort: Pulmonary effort is normal.   Musculoskeletal:      Cervical back: Normal range of motion and neck supple.   Skin:     General: Skin is warm and dry.   Neurological:      Mental Status: He is alert and oriented to person, place, and time.   Psychiatric:         Mood and Affect: Mood normal.         Behavior: Behavior normal.         Thought Content: Thought content normal.         Judgment: Judgment normal.

## 2022-06-02 ENCOUNTER — TELEPHONE (OUTPATIENT)
Dept: FAMILY MEDICINE | Facility: CLINIC | Age: 27
End: 2022-06-02

## 2022-06-02 DIAGNOSIS — F90.9 ATTENTION DEFICIT HYPERACTIVITY DISORDER (ADHD), UNSPECIFIED ADHD TYPE: ICD-10-CM

## 2022-06-02 NOTE — TELEPHONE ENCOUNTER
Reason for call:  Medication   If this is a refill request, has the caller requested the refill from the pharmacy already? N/A  Will the patient be using a [facility]? No  Name of the pharmacy and phone number for the current request: [facility]: [address] [address]     Name of the medication requested: (ADDERALL) 20 MG tablet    Other request: pt is looking to get med (ADDERALL) 20 MG tablet tablet sent over to a different [facility] pt will be out of town for a almost a month and his refill is for June 19th. Please reach out to pt regarding this matter asap so he can know either way thanks.     Phone number to reach patient:  Cell number on file:    Telephone Information:   Mobile [phone]       Best Time: Anytime   Can we leave a detailed message on this number?  YES    Travel screening: Not Applicable

## 2022-06-03 NOTE — TELEPHONE ENCOUNTER
Dr. Mensah,    A new script has to be sent due to controlled substance.     Thanks,  LICO Wagner  [facility]

## 2022-06-07 RX ORDER — DEXTROAMPHETAMINE SACCHARATE, AMPHETAMINE ASPARTATE, DEXTROAMPHETAMINE SULFATE AND AMPHETAMINE SULFATE 5; 5; 5; 5 MG/1; MG/1; MG/1; MG/1
20 TABLET ORAL 2 TIMES DAILY
Qty: 60 TABLET | Refills: 0 | Status: SHIPPED | OUTPATIENT
Start: 2022-06-07 | End: 2022-07-26

## 2022-07-25 ENCOUNTER — NURSE TRIAGE (OUTPATIENT)
Dept: NURSING | Facility: CLINIC | Age: 27
End: 2022-07-25

## 2022-07-25 DIAGNOSIS — F90.9 ATTENTION DEFICIT HYPERACTIVITY DISORDER (ADHD), UNSPECIFIED ADHD TYPE: ICD-10-CM

## 2022-07-25 NOTE — TELEPHONE ENCOUNTER
Caller wants his ADHD meds sent to a a pharmacy in [place]; is working there and  has no immediate plans to return  until October  Current RX  Is not transferable out of  [place] through pharmacy  [facility]  [address] [place]    Routing request to provider   Kacie Huston RN  [facility]      Outpatient Medications     Disp Refills Start End STEFF   amphetamine-dextroamphetamine (ADDERALL) 20 MG tablet 60 tablet 0 6/18/2022 7/18/2022 --   Sig - Route: Take 1 tablet (20 mg) by mouth 2 times daily - Oral   Sent to pharmacy as: Amphetamine-Dextroamphetamine 20 MG Oral Tablet (Adderall)   Class: E-Prescribe   Earliest Fill Date: 6/18/2022   Order: [id number]   E-Prescribing Status: Receipt confirmed by pharmacy (5/23/2022  1:55 PM CDT)   amphetamine-dextroamphetamine (ADDERALL) 20 MG tablet 60 tablet 0 7/18/2022 8/17/2022 --   Sig - Route: Take 1 tablet (20 mg) by mouth 2 times daily - Oral   Sent to pharmacy as: Amphetamine-Dextroamphetamine 20 MG Oral Tablet (Adderall)   Class: E-Prescribe   Earliest Fill Date: 7/15/2022   Order: [id number]   E-Prescribing Status: Receipt confirmed by pharmacy (5/23/2022  1:55 PM CDT)       Reason for Disposition    [1] Caller requesting a NON-URGENT new prescription or refill AND [2] triager unable to refill per unit policy    Protocols used: MEDICATION QUESTION CALL-A-

## 2022-07-28 RX ORDER — DEXTROAMPHETAMINE SACCHARATE, AMPHETAMINE ASPARTATE, DEXTROAMPHETAMINE SULFATE AND AMPHETAMINE SULFATE 5; 5; 5; 5 MG/1; MG/1; MG/1; MG/1
20 TABLET ORAL 2 TIMES DAILY
Qty: 60 TABLET | Refills: 0 | Status: SHIPPED | OUTPATIENT
Start: 2022-08-17 | End: 2022-08-29

## 2022-07-28 RX ORDER — DEXTROAMPHETAMINE SACCHARATE, AMPHETAMINE ASPARTATE, DEXTROAMPHETAMINE SULFATE AND AMPHETAMINE SULFATE 5; 5; 5; 5 MG/1; MG/1; MG/1; MG/1
20 TABLET ORAL 2 TIMES DAILY
Qty: 60 TABLET | Refills: 0 | Status: SHIPPED | OUTPATIENT
Start: 2022-07-28 | End: 2022-12-15

## 2022-08-25 DIAGNOSIS — F90.9 ATTENTION DEFICIT HYPERACTIVITY DISORDER (ADHD), UNSPECIFIED ADHD TYPE: ICD-10-CM

## 2022-08-25 NOTE — TELEPHONE ENCOUNTER
Pt calling. States [facility] is out of stock for his ADHD medication and is wondering what to do. Advised pt to contact different pharmacies and let our office know if he would like a prescription sent to a different pharmacy. Pt agrees with plan.    Luz Pope RN  [facility]

## 2022-08-25 NOTE — TELEPHONE ENCOUNTER
Pt calling with a pharmacy who has Adderall 20mg in stock. Please send prescription to pharmacy pended and notify pt once sent.     Routing refill request to provider for review/approval because:  Drug not on the [facility] refill protocol     Luz Pope RN  [facility]

## 2022-08-29 RX ORDER — DEXTROAMPHETAMINE SACCHARATE, AMPHETAMINE ASPARTATE, DEXTROAMPHETAMINE SULFATE AND AMPHETAMINE SULFATE 5; 5; 5; 5 MG/1; MG/1; MG/1; MG/1
20 TABLET ORAL 2 TIMES DAILY
Qty: 60 TABLET | Refills: 0 | Status: SHIPPED | OUTPATIENT
Start: 2022-08-29 | End: 2024-07-23

## 2022-08-29 NOTE — TELEPHONE ENCOUNTER
FYI: Pt states that he ran out of med last time and pharmacy was out of supply for about a week, so he just wanted to make sure that the Rx was on file at pharmacy.      Called patient and notified him that Rx was sent to pharmacy.    Prema Melo RN   [facility]

## 2022-08-29 NOTE — TELEPHONE ENCOUNTER
Routing to PCP high priority.    RX for Adderall is pended if agreeable.   [facility] however, they are out of stock.  Now requesting refill be sent to [facility].  Patient will be out of medication as of tomorrow.     Patient is calling to follow up with below request, which was sent on 8/25.    Please route back to RN pool as patient is requesting a call back at [phone] when completed.    Mando Serrato RN

## 2022-12-12 DIAGNOSIS — F90.9 ATTENTION DEFICIT HYPERACTIVITY DISORDER (ADHD), UNSPECIFIED ADHD TYPE: ICD-10-CM

## 2022-12-12 NOTE — TELEPHONE ENCOUNTER
Reason for call:  Medication   If this is a refill request, has the caller requested the refill from the pharmacy alread? Yes  Will the patient be using a [facility]? No  Name of the pharmacy and phone number for the current request:  [facility]    Name of the medication requested: amphetamine-dextroamphetamine (ADDERALL) 20 MG tablet    Other request: patient is scheduled for follow up     Next 5 appointments (look out 90 days)    Dec 29, 2022 11:00 AM  (Arrive by 10:40 AM)  Provider Visit with Akbar Mensah MD  [facility] ([facility] ) [address] [id number] [phone]        Phone number to reach patient:  Cell number on file:    Telephone Information:   Mobile [phone]       Best Time:      Can we leave a detailed message on this number?  YES    Travel screening: Not Applicable

## 2022-12-15 RX ORDER — DEXTROAMPHETAMINE SACCHARATE, AMPHETAMINE ASPARTATE, DEXTROAMPHETAMINE SULFATE AND AMPHETAMINE SULFATE 5; 5; 5; 5 MG/1; MG/1; MG/1; MG/1
20 TABLET ORAL 2 TIMES DAILY
Qty: 60 TABLET | Refills: 0 | Status: SHIPPED | OUTPATIENT
Start: 2022-12-15 | End: 2024-07-23

## 2022-12-29 ENCOUNTER — OFFICE VISIT (OUTPATIENT)
Dept: FAMILY MEDICINE | Facility: CLINIC | Age: 27
End: 2022-12-29

## 2022-12-29 VITALS
OXYGEN SATURATION: 99 % | DIASTOLIC BLOOD PRESSURE: 74 MMHG | TEMPERATURE: 98.5 F | HEART RATE: 81 BPM | WEIGHT: 130.6 LBS | SYSTOLIC BLOOD PRESSURE: 114 MMHG | BODY MASS INDEX: 19.79 KG/M2

## 2022-12-29 DIAGNOSIS — F90.9 ATTENTION DEFICIT HYPERACTIVITY DISORDER (ADHD), UNSPECIFIED ADHD TYPE: ICD-10-CM

## 2022-12-29 DIAGNOSIS — Z23 NEED FOR PROPHYLACTIC VACCINATION AND INOCULATION AGAINST INFLUENZA: Primary | ICD-10-CM

## 2022-12-29 PROCEDURE — 90686 IIV4 VACC NO PRSV 0.5 ML IM: CPT | Performed by: FAMILY MEDICINE

## 2022-12-29 PROCEDURE — 90471 IMMUNIZATION ADMIN: CPT | Performed by: FAMILY MEDICINE

## 2022-12-29 PROCEDURE — 99213 OFFICE O/P EST LOW 20 MIN: CPT | Mod: 25 | Performed by: FAMILY MEDICINE

## 2022-12-29 RX ORDER — DEXTROAMPHETAMINE SACCHARATE, AMPHETAMINE ASPARTATE, DEXTROAMPHETAMINE SULFATE AND AMPHETAMINE SULFATE 5; 5; 5; 5 MG/1; MG/1; MG/1; MG/1
20 TABLET ORAL 2 TIMES DAILY
Qty: 60 TABLET | Refills: 0 | Status: SHIPPED | OUTPATIENT
Start: 2023-01-29 | End: 2023-02-07

## 2022-12-29 RX ORDER — DEXTROAMPHETAMINE SACCHARATE, AMPHETAMINE ASPARTATE, DEXTROAMPHETAMINE SULFATE AND AMPHETAMINE SULFATE 5; 5; 5; 5 MG/1; MG/1; MG/1; MG/1
20 TABLET ORAL 2 TIMES DAILY
Qty: 60 TABLET | Refills: 0 | Status: SHIPPED | OUTPATIENT
Start: 2022-12-29 | End: 2023-01-28

## 2022-12-29 RX ORDER — DEXTROAMPHETAMINE SACCHARATE, AMPHETAMINE ASPARTATE, DEXTROAMPHETAMINE SULFATE AND AMPHETAMINE SULFATE 5; 5; 5; 5 MG/1; MG/1; MG/1; MG/1
20 TABLET ORAL 2 TIMES DAILY
Qty: 60 TABLET | Refills: 0 | Status: SHIPPED | OUTPATIENT
Start: 2023-03-01 | End: 2023-03-31

## 2022-12-29 NOTE — PROGRESS NOTES
Assessment & Plan       ICD-10-CM    1. Need for prophylactic vaccination and inoculation against influenza  Z23 INFLUENZA VACCINE IM > 6 MONTHS VALENT IIV4 (AFLURIA/FLUZONE)      2. Attention deficit hyperactivity disorder (ADHD), unspecified ADHD type  F90.9 amphetamine-dextroamphetamine (ADDERALL) 20 MG tablet     amphetamine-dextroamphetamine (ADDERALL) 20 MG tablet     amphetamine-dextroamphetamine (ADDERALL) 20 MG tablet            Review of external notes as documented elsewhere in note             No follow-ups on file.    Akbar Fajardo MD  [facility] BEBE Rose is a 27 year old, presenting for the following health issues:  Recheck Medication (Adhd meds)      History of Present Illness       Reason for visit:  Fallow up on adhd medication  Symptom onset:  More than a month  Symptom progression:  Staying the same    He eats 2-3 servings of fruits and vegetables daily.He consumes 2 sweetened beverage(s) daily.He exercises with enough effort to increase his heart rate 10 to 19 minutes per day.  He exercises with enough effort to increase his heart rate 4 days per week.   He is taking medications regularly.             Review of Systems   Constitutional, HEENT, cardiovascular, pulmonary, gi and gu systems are negative, except as otherwise noted.      Objective    /74 (BP Location: Right arm, Patient Position: Sitting, Cuff Size: Adult Regular)   Pulse 81   Temp 98.5  F (36.9  C) (Oral)   Wt 59.2 kg (130 lb 9.6 oz)   SpO2 99%   BMI 19.79 kg/m    Body mass index is 19.79 kg/m .  Physical Exam  Constitutional:       General: He is not in acute distress.     Appearance: Normal appearance. He is well-developed. He is not ill-appearing.   HENT:      Head: Normocephalic and atraumatic.      Right Ear: External ear normal.      Left Ear: External ear normal.      Nose: Nose normal.   Eyes:      General: No scleral icterus.     Extraocular Movements: Extraocular  movements intact.      Conjunctiva/sclera: Conjunctivae normal.   Cardiovascular:      Rate and Rhythm: Normal rate.   Pulmonary:      Effort: Pulmonary effort is normal.   Musculoskeletal:      Cervical back: Normal range of motion and neck supple.   Skin:     General: Skin is warm and dry.   Neurological:      Mental Status: He is alert and oriented to person, place, and time.   Psychiatric:         Behavior: Behavior normal.         Thought Content: Thought content normal.         Judgment: Judgment normal.

## 2023-02-06 ENCOUNTER — TELEPHONE (OUTPATIENT)
Dept: FAMILY MEDICINE | Facility: CLINIC | Age: 28
End: 2023-02-06

## 2023-02-06 NOTE — TELEPHONE ENCOUNTER
Called pharmacy and confirmed medication is on file but patient picked up last script 01/11/2023. Called and left VM for patient informing it may be too soon to refill but to call a couple days before and speak to a pharmacist/tech to request the medication to be filled. No further concerns at this time. Tammy Young MA

## 2023-02-06 NOTE — TELEPHONE ENCOUNTER
Reason for call:  Medication   If this is a refill request, has the caller requested the refill from the pharmacy already? yes  Will the patient be using a [facility]? No  Name of the pharmacy and phone number for the current request:    [id number] - [facility], [place] - [address]    Prescription should be on profile at the pharmacy, patient's pharmacy said that they do not have the script on file. He was told to contact his doctor's office.    Name of the medication requested: amphetamine-dextroamphetamine (ADDERALL) 20 MG tablet    amphetamine-dextroamphetamine (ADDERALL) 20 MG tablet 60 tablet 0 1/29/2023 2/28/2023   Sig:   Take 1 tablet (20 mg) by mouth 2 times daily for 30 days     Route:   Oral     Class:   E-Prescribe     Earliest Fill Date:   1/26/2023     Other request:     Phone number to reach patient:  Cell number on file:    Telephone Information:   Mobile [phone]     Best Time:    Can we leave a detailed message on this number?  YES    Travel screening: Not Applicable

## 2023-02-06 NOTE — TELEPHONE ENCOUNTER
Reason for call:  Other   Patient called regarding (reason for call): call back  Additional comments: Patient is calling back, he was read the message below. He said that [facility] told him they do not have any other scripts on file for him.    He wanted to make sure, that other prescriptions where sent over.     Phone number to reach patient:  Cell number on file:    Telephone Information:   Mobile [phone]     Best Time:      Can we leave a detailed message on this number?  YES    Travel screening: Not Applicable

## 2023-02-07 DIAGNOSIS — F90.9 ATTENTION DEFICIT HYPERACTIVITY DISORDER (ADHD), UNSPECIFIED ADHD TYPE: ICD-10-CM

## 2023-02-07 RX ORDER — DEXTROAMPHETAMINE SACCHARATE, AMPHETAMINE ASPARTATE, DEXTROAMPHETAMINE SULFATE AND AMPHETAMINE SULFATE 5; 5; 5; 5 MG/1; MG/1; MG/1; MG/1
20 TABLET ORAL 2 TIMES DAILY
Qty: 60 TABLET | Refills: 0 | Status: SHIPPED | OUTPATIENT
Start: 2023-02-07 | End: 2024-07-23

## 2023-02-07 NOTE — TELEPHONE ENCOUNTER
Patient last picked up adderall was on 1/11/23.     [facility] but they are out of stock.    Patient found [facility] in [place] is in stock, routing to PCP.    Please call patient when signed ok for detailed message.     Liberty Benedict RN  [facility]

## 2023-03-03 DIAGNOSIS — F90.9 ATTENTION DEFICIT HYPERACTIVITY DISORDER (ADHD), UNSPECIFIED ADHD TYPE: ICD-10-CM

## 2023-03-03 RX ORDER — DEXTROAMPHETAMINE SACCHARATE, AMPHETAMINE ASPARTATE, DEXTROAMPHETAMINE SULFATE AND AMPHETAMINE SULFATE 5; 5; 5; 5 MG/1; MG/1; MG/1; MG/1
20 TABLET ORAL 2 TIMES DAILY
Qty: 60 TABLET | Refills: 0 | Status: SHIPPED | OUTPATIENT
Start: 2023-03-03 | End: 2023-04-02

## 2023-03-03 RX ORDER — DEXTROAMPHETAMINE SACCHARATE, AMPHETAMINE ASPARTATE, DEXTROAMPHETAMINE SULFATE AND AMPHETAMINE SULFATE 5; 5; 5; 5 MG/1; MG/1; MG/1; MG/1
TABLET ORAL
Qty: 60 TABLET | Refills: 0 | OUTPATIENT
Start: 2023-03-03

## 2023-03-03 RX ORDER — DEXTROAMPHETAMINE SACCHARATE, AMPHETAMINE ASPARTATE, DEXTROAMPHETAMINE SULFATE AND AMPHETAMINE SULFATE 5; 5; 5; 5 MG/1; MG/1; MG/1; MG/1
20 TABLET ORAL 2 TIMES DAILY
Qty: 60 TABLET | Refills: 0 | Status: SHIPPED | OUTPATIENT
Start: 2023-05-04 | End: 2023-06-03

## 2023-03-03 RX ORDER — DEXTROAMPHETAMINE SACCHARATE, AMPHETAMINE ASPARTATE, DEXTROAMPHETAMINE SULFATE AND AMPHETAMINE SULFATE 5; 5; 5; 5 MG/1; MG/1; MG/1; MG/1
20 TABLET ORAL 2 TIMES DAILY
Qty: 60 TABLET | Refills: 0 | Status: SHIPPED | OUTPATIENT
Start: 2023-04-03 | End: 2023-05-03

## 2023-05-25 ENCOUNTER — TELEPHONE (OUTPATIENT)
Dept: FAMILY MEDICINE | Facility: CLINIC | Age: 28
End: 2023-05-25

## 2023-05-25 NOTE — TELEPHONE ENCOUNTER
Patient calling, states that he has Aderall prescription sent between [facility] and [facility] pharmacy, asking where last refill was sent as he has not picked this up yet.    Writer relayed that last refill date 5/4/23-6/3/23 was sent to [facility]. Patient states understanding and will call back if there are any issues.    ELIJAH CoffeyN RN  [facility]

## 2023-07-20 DIAGNOSIS — F90.9 ATTENTION DEFICIT HYPERACTIVITY DISORDER (ADHD), UNSPECIFIED ADHD TYPE: ICD-10-CM

## 2023-07-20 RX ORDER — DEXTROAMPHETAMINE SACCHARATE, AMPHETAMINE ASPARTATE, DEXTROAMPHETAMINE SULFATE AND AMPHETAMINE SULFATE 5; 5; 5; 5 MG/1; MG/1; MG/1; MG/1
20 TABLET ORAL 2 TIMES DAILY
Qty: 60 TABLET | Refills: 0 | Status: SHIPPED | OUTPATIENT
Start: 2023-09-20 | End: 2023-10-17

## 2023-07-20 RX ORDER — DEXTROAMPHETAMINE SACCHARATE, AMPHETAMINE ASPARTATE, DEXTROAMPHETAMINE SULFATE AND AMPHETAMINE SULFATE 5; 5; 5; 5 MG/1; MG/1; MG/1; MG/1
20 TABLET ORAL 2 TIMES DAILY
Qty: 60 TABLET | Refills: 0 | Status: CANCELLED | OUTPATIENT
Start: 2023-07-20

## 2023-07-20 RX ORDER — DEXTROAMPHETAMINE SACCHARATE, AMPHETAMINE ASPARTATE, DEXTROAMPHETAMINE SULFATE AND AMPHETAMINE SULFATE 5; 5; 5; 5 MG/1; MG/1; MG/1; MG/1
20 TABLET ORAL 2 TIMES DAILY
Qty: 60 TABLET | Refills: 0 | Status: SHIPPED | OUTPATIENT
Start: 2023-08-20 | End: 2023-09-19

## 2023-07-20 RX ORDER — DEXTROAMPHETAMINE SACCHARATE, AMPHETAMINE ASPARTATE, DEXTROAMPHETAMINE SULFATE AND AMPHETAMINE SULFATE 5; 5; 5; 5 MG/1; MG/1; MG/1; MG/1
20 TABLET ORAL 2 TIMES DAILY
Qty: 60 TABLET | Refills: 0 | Status: SHIPPED | OUTPATIENT
Start: 2023-07-20 | End: 2023-08-19

## 2023-07-20 NOTE — TELEPHONE ENCOUNTER
Pt calling to request a refill for Adderall. [facility] does not have any remaining scripts on file. Please advise.    Luz Pope RN  [facility]

## 2023-10-16 DIAGNOSIS — F90.9 ATTENTION DEFICIT HYPERACTIVITY DISORDER (ADHD), UNSPECIFIED ADHD TYPE: ICD-10-CM

## 2023-10-16 NOTE — TELEPHONE ENCOUNTER
Medication Question or Refill    Contacts         Type Contact Phone/Fax    10/16/2023 11:55 AM CDT Interface (Incoming) [id number] - [place], [place] - [address] [phone]    10/16/2023 12:00 PM CDT Phone (Incoming) Rose Johnson (Self) [phone] (M)        What medication are you calling about (include dose and sig)?:  amphetamine-dextroamphetamine (ADDERALL) 20 MG tablet         Preferred Pharmacy:   [id number] - [place], [place] - [address]  [address] [address]  Phone: [phone] Fax: [phone]    Controlled Substance Agreement on file:   CSA -- Patient Level:     [Media Unavailable] Controlled Substance Agreement - Non - Opioid - Scan on 5/31/2022 11:20 AM: NON-OPIOID CONTROLLED SUBSTANCE AGREEMENT   [Media Unavailable] Controlled Substance Agreement - Non - Opioid - Scan on 1/29/2021 10:41 AM: NON-OPIOID CONTROLLED SUBSTANCE AGREEMENT       Who prescribed the medication?: Akbar Fajardo   Do you need a refill? Yes  When did you use the medication last? daily  Patient offered an appointment? Yes:   Next 5 appointments (look out 90 days)      Oct 27, 2023  3:00 PM  (Arrive by 2:40 PM)  Provider Visit with Akbar Fajardo MD  [facility] ([facility] ) [address] [place], [place] [address] [id number] [phone]        Do you have any questions or concerns?  No  Okay to leave a detailed message?: Yes at Cell number on file:    Telephone Information:   Mobile [phone]

## 2023-10-17 RX ORDER — DEXTROAMPHETAMINE SACCHARATE, AMPHETAMINE ASPARTATE, DEXTROAMPHETAMINE SULFATE AND AMPHETAMINE SULFATE 5; 5; 5; 5 MG/1; MG/1; MG/1; MG/1
20 TABLET ORAL 2 TIMES DAILY
Qty: 60 TABLET | Refills: 0 | Status: SHIPPED | OUTPATIENT
Start: 2023-10-17 | End: 2023-11-20

## 2023-11-16 ENCOUNTER — TELEPHONE (OUTPATIENT)
Dept: FAMILY MEDICINE | Facility: CLINIC | Age: 28
End: 2023-11-16

## 2023-11-16 DIAGNOSIS — F90.9 ATTENTION DEFICIT HYPERACTIVITY DISORDER (ADHD), UNSPECIFIED ADHD TYPE: ICD-10-CM

## 2023-11-16 NOTE — TELEPHONE ENCOUNTER
Medication Question or Refill    Contacts         Type Contact Phone/Fax    11/16/2023 01:04 PM CST Phone (Incoming) Rose Johnson (Self) [phone] (M)        What medication are you calling about (include dose and sig)?: amphetamine-dextroamphetamine (ADDERALL) 20 MG tablet     Preferred Pharmacy:   [id number] - [place] - [address]  [address]  [place] [address]  Phone: [phone] Fax: [phone]  Controlled Substance Agreement on file:   CSA -- Patient Level:     [Media Unavailable] Controlled Substance Agreement - Non - Opioid - Scan on 5/31/2022 11:20 AM: NON-OPIOID CONTROLLED SUBSTANCE AGREEMENT   [Media Unavailable] Controlled Substance Agreement - Non - Opioid - Scan on 1/29/2021 10:41 AM: NON-OPIOID CONTROLLED SUBSTANCE AGREEMENT       Who prescribed the medication?: Akbar Fajardo   Do you need a refill? Yes  Do you have any questions or concerns?  Yes: patient is scheduled for the next in clinic opening. Though will be out of medications. Can a refill be sent over to get them to the appointment?    He will be out of medications by 11/20/2023  Okay to leave a detailed message?: Yes at Cell number on file:    Telephone Information:   Mobile [phone]     Next 5 appointments (look out 90 days)      Nov 27, 2023  1:30 PM  (Arrive by 1:10 PM)  Provider Visit with Akbar Fajardo MD  [facility] ([facility] [address]  [place] [address] [phone]         Aileen Baird,

## 2023-11-17 DIAGNOSIS — F90.9 ATTENTION DEFICIT HYPERACTIVITY DISORDER (ADHD), UNSPECIFIED ADHD TYPE: ICD-10-CM

## 2023-11-17 RX ORDER — DEXTROAMPHETAMINE SACCHARATE, AMPHETAMINE ASPARTATE, DEXTROAMPHETAMINE SULFATE AND AMPHETAMINE SULFATE 5; 5; 5; 5 MG/1; MG/1; MG/1; MG/1
20 TABLET ORAL 2 TIMES DAILY
Qty: 60 TABLET | Refills: 0 | OUTPATIENT
Start: 2023-11-17

## 2023-11-17 NOTE — TELEPHONE ENCOUNTER
Has been 11 months since last visit.   Overdue for a follow-up for any further refills of controlled substance.  Vivian refill was given last time and he was a no show to 10/27/2023 appointment  Please keep upcoming appointment for any refills    Sergio Murillo RN  [facility]

## 2023-11-20 RX ORDER — DEXTROAMPHETAMINE SACCHARATE, AMPHETAMINE ASPARTATE, DEXTROAMPHETAMINE SULFATE AND AMPHETAMINE SULFATE 5; 5; 5; 5 MG/1; MG/1; MG/1; MG/1
20 TABLET ORAL 2 TIMES DAILY
Qty: 30 TABLET | Refills: 0 | Status: SHIPPED | OUTPATIENT
Start: 2023-11-20 | End: 2024-07-23

## 2023-11-20 RX ORDER — DEXTROAMPHETAMINE SACCHARATE, AMPHETAMINE ASPARTATE, DEXTROAMPHETAMINE SULFATE AND AMPHETAMINE SULFATE 5; 5; 5; 5 MG/1; MG/1; MG/1; MG/1
20 TABLET ORAL 2 TIMES DAILY
Qty: 60 TABLET | Refills: 0 | OUTPATIENT
Start: 2023-11-20

## 2023-11-20 NOTE — TELEPHONE ENCOUNTER
Patient stated he is scheduled on 11/27/23.    Patient would like to be updated when this is sent.     Leigh Strickland RN on 11/20/2023 at 12:51 PM

## 2023-11-27 ENCOUNTER — OFFICE VISIT (OUTPATIENT)
Dept: FAMILY MEDICINE | Facility: CLINIC | Age: 28
End: 2023-11-27

## 2023-11-27 VITALS
BODY MASS INDEX: 21.55 KG/M2 | WEIGHT: 142.2 LBS | HEART RATE: 83 BPM | SYSTOLIC BLOOD PRESSURE: 145 MMHG | TEMPERATURE: 98.8 F | DIASTOLIC BLOOD PRESSURE: 81 MMHG | OXYGEN SATURATION: 99 %

## 2023-11-27 DIAGNOSIS — F90.9 ATTENTION DEFICIT HYPERACTIVITY DISORDER (ADHD), UNSPECIFIED ADHD TYPE: Primary | ICD-10-CM

## 2023-11-27 DIAGNOSIS — Z23 NEED FOR PROPHYLACTIC VACCINATION AND INOCULATION AGAINST INFLUENZA: ICD-10-CM

## 2023-11-27 DIAGNOSIS — Z23 HIGH PRIORITY FOR 2019-NCOV VACCINE: ICD-10-CM

## 2023-11-27 DIAGNOSIS — F90.9 ATTENTION DEFICIT HYPERACTIVITY DISORDER (ADHD), UNSPECIFIED ADHD TYPE: ICD-10-CM

## 2023-11-27 PROCEDURE — 90686 IIV4 VACC NO PRSV 0.5 ML IM: CPT | Performed by: FAMILY MEDICINE

## 2023-11-27 PROCEDURE — 90471 IMMUNIZATION ADMIN: CPT | Performed by: FAMILY MEDICINE

## 2023-11-27 PROCEDURE — 99213 OFFICE O/P EST LOW 20 MIN: CPT | Mod: 25 | Performed by: FAMILY MEDICINE

## 2023-11-27 PROCEDURE — 90480 ADMN SARSCOV2 VAC 1/ONLY CMP: CPT | Performed by: FAMILY MEDICINE

## 2023-11-27 PROCEDURE — 91320 SARSCV2 VAC 30MCG TRS-SUC IM: CPT | Performed by: FAMILY MEDICINE

## 2023-11-27 RX ORDER — DEXTROAMPHETAMINE SACCHARATE, AMPHETAMINE ASPARTATE, DEXTROAMPHETAMINE SULFATE AND AMPHETAMINE SULFATE 5; 5; 5; 5 MG/1; MG/1; MG/1; MG/1
20 TABLET ORAL 2 TIMES DAILY
Qty: 60 TABLET | Refills: 0 | Status: CANCELLED | OUTPATIENT
Start: 2023-11-27

## 2023-11-27 RX ORDER — DEXTROAMPHETAMINE SACCHARATE, AMPHETAMINE ASPARTATE, DEXTROAMPHETAMINE SULFATE AND AMPHETAMINE SULFATE 5; 5; 5; 5 MG/1; MG/1; MG/1; MG/1
20 TABLET ORAL 2 TIMES DAILY
Qty: 60 TABLET | Refills: 0 | Status: SHIPPED | OUTPATIENT
Start: 2023-12-28 | End: 2024-01-27

## 2023-11-27 RX ORDER — DEXTROAMPHETAMINE SACCHARATE, AMPHETAMINE ASPARTATE, DEXTROAMPHETAMINE SULFATE AND AMPHETAMINE SULFATE 5; 5; 5; 5 MG/1; MG/1; MG/1; MG/1
20 TABLET ORAL 2 TIMES DAILY
Qty: 60 TABLET | Refills: 0 | Status: SHIPPED | OUTPATIENT
Start: 2023-11-27 | End: 2023-12-27

## 2023-11-27 RX ORDER — DEXTROAMPHETAMINE SACCHARATE, AMPHETAMINE ASPARTATE, DEXTROAMPHETAMINE SULFATE AND AMPHETAMINE SULFATE 5; 5; 5; 5 MG/1; MG/1; MG/1; MG/1
20 TABLET ORAL 2 TIMES DAILY
Qty: 60 TABLET | Refills: 0 | Status: SHIPPED | OUTPATIENT
Start: 2024-01-28 | End: 2024-02-27

## 2023-11-27 NOTE — PROGRESS NOTES
Assessment & Plan       ICD-10-CM    1. Attention deficit hyperactivity disorder (ADHD), unspecified ADHD type  F90.9 amphetamine-dextroamphetamine (ADDERALL) 20 MG tablet     amphetamine-dextroamphetamine (ADDERALL) 20 MG tablet     amphetamine-dextroamphetamine (ADDERALL) 20 MG tablet      2. Need for prophylactic vaccination and inoculation against influenza  Z23       3. High priority for 2019-nCoV vaccine  Z23             Review of external notes as documented elsewhere in note         There are no Patient Instructions on file for this visit.    Akbar Fajardo MD  [facility] BEBE Rose is a 28 year old, presenting for the following health issues:  Recheck Medication      11/27/2023     1:31 PM   Additional Questions   Roomed by Tammy   Accompanied by Partner         11/27/2023     1:31 PM   Patient Reported Additional Medications   Patient reports taking the following new medications none       History of Present Illness       Reason for visit:  Check up    He eats 2-3 servings of fruits and vegetables daily.He consumes 4 sweetened beverage(s) daily.He exercises with enough effort to increase his heart rate 20 to 29 minutes per day.  He exercises with enough effort to increase his heart rate 4 days per week.   He is taking medications regularly.                 Review of Systems   Constitutional, HEENT, cardiovascular, pulmonary, gi and gu systems are negative, except as otherwise noted.      Objective    BP (!) 145/81   Pulse 83   Temp 98.8  F (37.1  C) (Oral)   Wt 64.5 kg (142 lb 3.2 oz)   SpO2 99%   BMI 21.55 kg/m    Body mass index is 21.55 kg/m .  Physical Exam  Constitutional:       General: He is not in acute distress.     Appearance: Normal appearance. He is well-developed. He is not ill-appearing.   HENT:      Head: Normocephalic and atraumatic.      Right Ear: External ear normal.      Left Ear: External ear normal.      Nose: Nose normal.   Eyes:       General: No scleral icterus.     Extraocular Movements: Extraocular movements intact.      Conjunctiva/sclera: Conjunctivae normal.   Cardiovascular:      Rate and Rhythm: Normal rate.   Pulmonary:      Effort: Pulmonary effort is normal.   Musculoskeletal:      Cervical back: Normal range of motion and neck supple.   Skin:     General: Skin is warm and dry.   Neurological:      Mental Status: He is alert and oriented to person, place, and time.   Psychiatric:         Behavior: Behavior normal.         Thought Content: Thought content normal.         Judgment: Judgment normal.

## 2023-11-28 RX ORDER — DEXTROAMPHETAMINE SACCHARATE, AMPHETAMINE ASPARTATE, DEXTROAMPHETAMINE SULFATE AND AMPHETAMINE SULFATE 5; 5; 5; 5 MG/1; MG/1; MG/1; MG/1
20 TABLET ORAL 2 TIMES DAILY
Qty: 60 TABLET | Refills: 0 | OUTPATIENT
Start: 2023-11-28

## 2024-01-11 ENCOUNTER — VIRTUAL VISIT (OUTPATIENT)
Dept: FAMILY MEDICINE | Facility: CLINIC | Age: 29
End: 2024-01-11

## 2024-01-11 DIAGNOSIS — F90.9 ATTENTION DEFICIT HYPERACTIVITY DISORDER (ADHD), UNSPECIFIED ADHD TYPE: ICD-10-CM

## 2024-01-11 PROCEDURE — 99442 PR PHYSICIAN TELEPHONE EVALUATION 11-20 MIN: CPT | Mod: 93 | Performed by: FAMILY MEDICINE

## 2024-01-11 RX ORDER — DEXTROAMPHETAMINE SACCHARATE, AMPHETAMINE ASPARTATE, DEXTROAMPHETAMINE SULFATE AND AMPHETAMINE SULFATE 5; 5; 5; 5 MG/1; MG/1; MG/1; MG/1
20 TABLET ORAL 2 TIMES DAILY
Qty: 60 TABLET | Refills: 0 | Status: CANCELLED | OUTPATIENT
Start: 2024-01-11

## 2024-01-11 NOTE — PROGRESS NOTES
Rose is a 28 year old who is being evaluated via a billable telephone visit.      What phone number would you like to be contacted at? [phone]  How would you like to obtain your AVS? Mail a copy    Distant Location (provider location):  On-site    Assessment & Plan       ICD-10-CM    1. Attention deficit hyperactivity disorder (ADHD), unspecified ADHD type  F90.9 amphetamine-dextroamphetamine (ADDERALL) 20 MG tablet     amphetamine-dextroamphetamine (ADDERALL) 20 MG tablet     amphetamine-dextroamphetamine (ADDERALL) 20 MG tablet            Review of external notes as documented elsewhere in note         There are no Patient Instructions on file for this visit.    Akbar Fajardo MD  [facility] BEBE Rose is a 28 year old, presenting for the following health issues:  Recheck Medication      1/11/2024     4:37 PM   Additional Questions   Roomed by Tammy   Accompanied by none       HPI     Medication Followup of Adderall 20mg  Taking Medication as prescribed: yes  Side Effects:  None  Medication Helping Symptoms:  yes        Review of Systems   Constitutional, HEENT, cardiovascular, pulmonary, gi and gu systems are negative, except as otherwise noted.      Objective           Vitals:  No vitals were obtained today due to virtual visit.    Physical Exam   healthy, alert, and no distress  PSYCH: Alert and oriented times 3; coherent speech, normal   rate and volume, able to articulate logical thoughts, able   to abstract reason, no tangential thoughts, no hallucinations   or delusions  His affect is normal  RESP: No cough, no audible wheezing, able to talk in full sentences  Remainder of exam unable to be completed due to telephone visits                Phone call duration: 13 minutes

## 2024-01-12 RX ORDER — DEXTROAMPHETAMINE SACCHARATE, AMPHETAMINE ASPARTATE, DEXTROAMPHETAMINE SULFATE AND AMPHETAMINE SULFATE 5; 5; 5; 5 MG/1; MG/1; MG/1; MG/1
20 TABLET ORAL 2 TIMES DAILY
Qty: 60 TABLET | Refills: 0 | Status: SHIPPED | OUTPATIENT
Start: 2024-04-27 | End: 2024-05-24

## 2024-01-12 RX ORDER — DEXTROAMPHETAMINE SACCHARATE, AMPHETAMINE ASPARTATE, DEXTROAMPHETAMINE SULFATE AND AMPHETAMINE SULFATE 5; 5; 5; 5 MG/1; MG/1; MG/1; MG/1
20 TABLET ORAL 2 TIMES DAILY
Qty: 60 TABLET | Refills: 0 | Status: SHIPPED | OUTPATIENT
Start: 2024-03-28 | End: 2024-04-27

## 2024-01-12 RX ORDER — DEXTROAMPHETAMINE SACCHARATE, AMPHETAMINE ASPARTATE, DEXTROAMPHETAMINE SULFATE AND AMPHETAMINE SULFATE 5; 5; 5; 5 MG/1; MG/1; MG/1; MG/1
20 TABLET ORAL 2 TIMES DAILY
Qty: 60 TABLET | Refills: 0 | Status: SHIPPED | OUTPATIENT
Start: 2024-02-27 | End: 2024-03-28

## 2024-05-24 DIAGNOSIS — F90.9 ATTENTION DEFICIT HYPERACTIVITY DISORDER (ADHD), UNSPECIFIED ADHD TYPE: ICD-10-CM

## 2024-05-24 RX ORDER — DEXTROAMPHETAMINE SACCHARATE, AMPHETAMINE ASPARTATE, DEXTROAMPHETAMINE SULFATE AND AMPHETAMINE SULFATE 5; 5; 5; 5 MG/1; MG/1; MG/1; MG/1
20 TABLET ORAL 2 TIMES DAILY
Qty: 60 TABLET | Refills: 0 | Status: SHIPPED | OUTPATIENT
Start: 2024-05-24 | End: 2024-06-25

## 2024-05-24 NOTE — TELEPHONE ENCOUNTER
Patient called, and was just making sure that we received this request.    Darling BSN RN  Triage Nurse  [facility]

## 2024-06-21 DIAGNOSIS — F90.9 ATTENTION DEFICIT HYPERACTIVITY DISORDER (ADHD), UNSPECIFIED ADHD TYPE: ICD-10-CM

## 2024-06-21 NOTE — TELEPHONE ENCOUNTER
Patient calling again to request refill update. Patient would like this filled as soon as possible, as he is completely out.    Advised to patient that message was sent, and may send as high priority to PCP however cannot guarantee that this be addressed by the end fo today as provider is seeing patients in clinic. Patient verbalized understanding and has no further questions at this time.     ELIJAH HenningN RN  [facility]

## 2024-06-21 NOTE — TELEPHONE ENCOUNTER
Patient calling to check on refill request    Patient is also wanting to know if he needs a follow up appointment anytime soon - last visit was 1/11/24 for daphnie Benedict RN  [facility]

## 2024-06-24 NOTE — TELEPHONE ENCOUNTER
Patient calling again regarding refill request.     Please advise.    ELIJAH HenningN RN  [facility]

## 2024-06-24 NOTE — TELEPHONE ENCOUNTER
Pt calling again for refill, appears pt has not been seen since 1/11/24.     Do you want a med check with pt?    Vivian Hdz, RN on 6/24/2024 at 3:13 PM

## 2024-06-25 RX ORDER — DEXTROAMPHETAMINE SACCHARATE, AMPHETAMINE ASPARTATE, DEXTROAMPHETAMINE SULFATE AND AMPHETAMINE SULFATE 5; 5; 5; 5 MG/1; MG/1; MG/1; MG/1
20 TABLET ORAL 2 TIMES DAILY
Qty: 60 TABLET | Refills: 0 | Status: SHIPPED | OUTPATIENT
Start: 2024-06-25

## 2024-06-25 NOTE — TELEPHONE ENCOUNTER
Spoke with patient. Relayed provider's message as written. Patient verbalized understanding and has no further questions at this time.    Patient plans to call back to make appointment.     DAGOBERTO Henning RN  [facility]

## 2024-06-25 NOTE — TELEPHONE ENCOUNTER
Patient called again - states he is out of medication and really needs a refill or instructions on what to do in order to get this medication sent through.    ELIJAH QuevedoN RN  [facility]

## 2024-07-17 ENCOUNTER — TELEPHONE (OUTPATIENT)
Dept: FAMILY MEDICINE | Facility: CLINIC | Age: 29
End: 2024-07-17
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
## 2024-07-23 ENCOUNTER — VIRTUAL VISIT (OUTPATIENT)
Dept: FAMILY MEDICINE | Facility: CLINIC | Age: 29
End: 2024-07-23

## 2024-07-23 DIAGNOSIS — F90.9 ATTENTION DEFICIT HYPERACTIVITY DISORDER (ADHD), UNSPECIFIED ADHD TYPE: ICD-10-CM

## 2024-07-23 PROCEDURE — 99442 PR PHYSICIAN TELEPHONE EVALUATION 11-20 MIN: CPT | Mod: 93 | Performed by: FAMILY MEDICINE

## 2024-07-23 RX ORDER — DEXTROAMPHETAMINE SACCHARATE, AMPHETAMINE ASPARTATE, DEXTROAMPHETAMINE SULFATE AND AMPHETAMINE SULFATE 5; 5; 5; 5 MG/1; MG/1; MG/1; MG/1
20 TABLET ORAL 2 TIMES DAILY
Qty: 60 TABLET | Refills: 0 | Status: SHIPPED | OUTPATIENT
Start: 2024-08-22 | End: 2024-09-21

## 2024-07-23 RX ORDER — DEXTROAMPHETAMINE SACCHARATE, AMPHETAMINE ASPARTATE, DEXTROAMPHETAMINE SULFATE AND AMPHETAMINE SULFATE 5; 5; 5; 5 MG/1; MG/1; MG/1; MG/1
20 TABLET ORAL 2 TIMES DAILY
Qty: 60 TABLET | Refills: 0 | Status: SHIPPED | OUTPATIENT
Start: 2024-07-23 | End: 2024-07-29

## 2024-07-23 RX ORDER — DEXTROAMPHETAMINE SACCHARATE, AMPHETAMINE ASPARTATE, DEXTROAMPHETAMINE SULFATE AND AMPHETAMINE SULFATE 5; 5; 5; 5 MG/1; MG/1; MG/1; MG/1
20 TABLET ORAL 2 TIMES DAILY
Qty: 60 TABLET | Refills: 0 | Status: CANCELLED | OUTPATIENT
Start: 2024-07-23

## 2024-07-23 RX ORDER — DEXTROAMPHETAMINE SACCHARATE, AMPHETAMINE ASPARTATE, DEXTROAMPHETAMINE SULFATE AND AMPHETAMINE SULFATE 5; 5; 5; 5 MG/1; MG/1; MG/1; MG/1
20 TABLET ORAL 2 TIMES DAILY
Qty: 60 TABLET | Refills: 0 | Status: SHIPPED | OUTPATIENT
Start: 2024-09-21 | End: 2024-10-21

## 2024-07-23 NOTE — PROGRESS NOTES
Rose is a 29 year old who is being evaluated via a billable telephone visit.    What phone number would you like to be contacted at? [phone]  How would you like to obtain your AVS? Mail a copy  Originating Location (pt. Location): Home    Distant Location (provider location):  On-site    Assessment & Plan       ICD-10-CM    1. Attention deficit hyperactivity disorder (ADHD), unspecified ADHD type  F90.9 amphetamine-dextroamphetamine (ADDERALL) 20 MG tablet     amphetamine-dextroamphetamine (ADDERALL) 20 MG tablet     amphetamine-dextroamphetamine (ADDERALL) 20 MG tablet                  The longitudinal plan of care for the diagnosis(es)/condition(s) as documented were addressed during this visit. Due to the added complexity in care, I will continue to support Rose in the subsequent management and with ongoing continuity of care.   There are no Patient Instructions on file for this visit.    Subjective   Rose is a 29 year old, presenting for the following health issues:  Recheck Medication (amphetamine-dextroamphetamine (ADDERALL) 20 MG tablet)      7/23/2024    10:54 AM   Additional Questions   Roomed by Tammy   Accompanied by none         7/23/2024    10:54 AM   Patient Reported Additional Medications   Patient reports taking the following new medications none     HPI     Medication Followup of amphetamine-dextroamphetamine (ADDERALL) 20 MG tablet   Taking Medication as prescribed: yes  Side Effects:  None  Medication Helping Symptoms:  yes        Review of Systems  Constitutional, HEENT, cardiovascular, pulmonary, gi and gu systems are negative, except as otherwise noted.      Objective           Vitals:  No vitals were obtained today due to virtual visit.    Physical Exam   General: Alert and no distress //Respiratory: No audible wheeze, cough, or shortness of breath // Psychiatric:  Appropriate affect, tone, and pace of words            Phone call duration: 15 minutes  Signed Electronically by: Akbar  MD Scotty

## 2024-07-24 ENCOUNTER — TELEPHONE (OUTPATIENT)
Dept: FAMILY MEDICINE | Facility: CLINIC | Age: 29
End: 2024-07-24

## 2024-07-24 DIAGNOSIS — F90.9 ATTENTION DEFICIT HYPERACTIVITY DISORDER (ADHD), UNSPECIFIED ADHD TYPE: ICD-10-CM

## 2024-07-24 NOTE — TELEPHONE ENCOUNTER
"Received call from patient. He states that he ran over his Adderall when leaving the pharmacy today after picking up his meds. Apparently he was struggling to get his kid in the car, and it was \"chaotic\". He is asking for an additional refill to be sent through. Reports the cap broke and all of his tablets turned to \"powder\".    DAGOBERTO Quevedo RN  [facility]    "

## 2024-07-24 NOTE — TELEPHONE ENCOUNTER
Pt asking if he is going to get a respond from pcp today. RN informed pt that refill requests may take more than 1 business day to review and advise. Pt verbalized understanding and will wait for pcp's response.     Vivian Hdz RN on 7/24/2024 at 2:08 PM

## 2024-07-26 NOTE — TELEPHONE ENCOUNTER
Patient calling back regarding request. Patient requesting follow-up prior to weekend as he has been waiting for response for 2 days.    Please advise.    DAGOBERTO Henning RN  [facility]

## 2024-07-29 NOTE — TELEPHONE ENCOUNTER
Patient again calling for update on Adderall refill request. States that he went to  his refill on 7/24/24 and ran over then medication bottle which turned all medication to powder that he is unable to use. Patient notes that he was struggling to get his child in the car when this happened. Patient notes that he did have a few days left of old supply but is now out. Writer apologized for delay and notes that we are still waiting response from PCP.    Routing to PCP to please advise if additional replacement refill can be sent to pharmacy below for Adderall.    Pharmacy: [id number] - [place], [place] - [address]     Patient requesting a call back when this has been completed, CB#: [phone], terry ok    Thanks,  ELIJAH CoffeyN RN  [facility]

## 2024-07-31 RX ORDER — DEXTROAMPHETAMINE SACCHARATE, AMPHETAMINE ASPARTATE, DEXTROAMPHETAMINE SULFATE AND AMPHETAMINE SULFATE 5; 5; 5; 5 MG/1; MG/1; MG/1; MG/1
20 TABLET ORAL 2 TIMES DAILY
Qty: 60 TABLET | Refills: 0 | Status: SHIPPED | OUTPATIENT
Start: 2024-07-31

## 2024-07-31 NOTE — TELEPHONE ENCOUNTER
Patient again calling for update on medication as he had sent this request in one week ago. Writer apologized and advised that message has been sent to PCP and we are awaiting response, noted that we will reach out with further updates on this.    Routing to PCP and covering team as provider has RDO today.    Thanks,  ELIJAH CoffeyN RN  [facility]

## 2024-08-01 NOTE — TELEPHONE ENCOUNTER
Pt calling. States pharmacy is needing a verbal ok to fill Adderall today as this refill is early.     Routing to Provider to advise.    Routing as high priority. Pt has been without medication x 1 week.    Luz Pope RN  [facility]

## 2024-08-02 ENCOUNTER — TELEPHONE (OUTPATIENT)
Dept: FAMILY MEDICINE | Facility: CLINIC | Age: 29
End: 2024-08-02

## 2024-08-02 NOTE — TELEPHONE ENCOUNTER
"Received call from patient. He is calling as he is unable to fill his rx for amphetamine-dextroamphetamine (ADDERALL) 20 MG tablet sent in on 7/31.    Patient explains that pharmacy requesting a call to authorize early fill for his medication.    Called pharmacy. They clarified that patient had just picked up their last prescription on 7/24, therefore not able to fill without authorization from provider for 7/31 prescription. RN provided authorization as noted \"Earliest Fill Date: 7/31/2024\".    Informed patient.    ELIJAH HenningN RN  [facility]  "

## 2024-10-25 DIAGNOSIS — F90.9 ATTENTION DEFICIT HYPERACTIVITY DISORDER (ADHD), UNSPECIFIED ADHD TYPE: ICD-10-CM

## 2024-10-25 RX ORDER — DEXTROAMPHETAMINE SACCHARATE, AMPHETAMINE ASPARTATE, DEXTROAMPHETAMINE SULFATE AND AMPHETAMINE SULFATE 5; 5; 5; 5 MG/1; MG/1; MG/1; MG/1
20 TABLET ORAL 2 TIMES DAILY
Qty: 60 TABLET | Refills: 0 | Status: SHIPPED | OUTPATIENT
Start: 2024-10-25 | End: 2024-11-24

## 2024-10-25 RX ORDER — DEXTROAMPHETAMINE SACCHARATE, AMPHETAMINE ASPARTATE, DEXTROAMPHETAMINE SULFATE AND AMPHETAMINE SULFATE 5; 5; 5; 5 MG/1; MG/1; MG/1; MG/1
20 TABLET ORAL 2 TIMES DAILY
Qty: 60 TABLET | Refills: 0 | Status: SHIPPED | OUTPATIENT
Start: 2024-11-24 | End: 2024-12-24

## 2024-10-25 RX ORDER — DEXTROAMPHETAMINE SACCHARATE, AMPHETAMINE ASPARTATE, DEXTROAMPHETAMINE SULFATE AND AMPHETAMINE SULFATE 5; 5; 5; 5 MG/1; MG/1; MG/1; MG/1
20 TABLET ORAL 2 TIMES DAILY
Qty: 60 TABLET | Refills: 0 | Status: CANCELLED | OUTPATIENT
Start: 2024-10-25

## 2024-10-25 RX ORDER — DEXTROAMPHETAMINE SACCHARATE, AMPHETAMINE ASPARTATE, DEXTROAMPHETAMINE SULFATE AND AMPHETAMINE SULFATE 5; 5; 5; 5 MG/1; MG/1; MG/1; MG/1
20 TABLET ORAL 2 TIMES DAILY
Qty: 60 TABLET | Refills: 0 | Status: SHIPPED | OUTPATIENT
Start: 2024-12-24 | End: 2025-01-23

## 2024-10-25 NOTE — TELEPHONE ENCOUNTER
Patient requesting refill. States he will be out of medication in the next few days.     Mery ARENAS  [facility]

## 2025-01-09 DIAGNOSIS — F90.9 ATTENTION DEFICIT HYPERACTIVITY DISORDER (ADHD), UNSPECIFIED ADHD TYPE: ICD-10-CM

## 2025-01-09 RX ORDER — DEXTROAMPHETAMINE SACCHARATE, AMPHETAMINE ASPARTATE, DEXTROAMPHETAMINE SULFATE AND AMPHETAMINE SULFATE 5; 5; 5; 5 MG/1; MG/1; MG/1; MG/1
20 TABLET ORAL 2 TIMES DAILY
Qty: 60 TABLET | Refills: 0 | Status: SHIPPED | OUTPATIENT
Start: 2025-01-09 | End: 2025-02-08

## 2025-05-13 DIAGNOSIS — F90.9 ATTENTION DEFICIT HYPERACTIVITY DISORDER (ADHD), UNSPECIFIED ADHD TYPE: ICD-10-CM

## 2025-05-13 RX ORDER — DEXTROAMPHETAMINE SACCHARATE, AMPHETAMINE ASPARTATE, DEXTROAMPHETAMINE SULFATE AND AMPHETAMINE SULFATE 5; 5; 5; 5 MG/1; MG/1; MG/1; MG/1
20 TABLET ORAL 2 TIMES DAILY
Qty: 60 TABLET | Refills: 0 | Status: SHIPPED | OUTPATIENT
Start: 2025-05-13 | End: 2025-06-12

## 2025-05-13 RX ORDER — DEXTROAMPHETAMINE SACCHARATE, AMPHETAMINE ASPARTATE, DEXTROAMPHETAMINE SULFATE AND AMPHETAMINE SULFATE 5; 5; 5; 5 MG/1; MG/1; MG/1; MG/1
20 TABLET ORAL 2 TIMES DAILY
Qty: 60 TABLET | Refills: 0 | Status: SHIPPED | OUTPATIENT
Start: 2025-07-12 | End: 2025-08-11

## 2025-05-13 RX ORDER — DEXTROAMPHETAMINE SACCHARATE, AMPHETAMINE ASPARTATE, DEXTROAMPHETAMINE SULFATE AND AMPHETAMINE SULFATE 5; 5; 5; 5 MG/1; MG/1; MG/1; MG/1
20 TABLET ORAL 2 TIMES DAILY
Qty: 60 TABLET | Refills: 0 | OUTPATIENT
Start: 2025-05-13

## 2025-05-13 RX ORDER — DEXTROAMPHETAMINE SACCHARATE, AMPHETAMINE ASPARTATE, DEXTROAMPHETAMINE SULFATE AND AMPHETAMINE SULFATE 5; 5; 5; 5 MG/1; MG/1; MG/1; MG/1
20 TABLET ORAL 2 TIMES DAILY
Qty: 60 TABLET | Refills: 0 | Status: SHIPPED | OUTPATIENT
Start: 2025-06-12 | End: 2025-07-12

## 2025-05-29 ENCOUNTER — TELEPHONE (OUTPATIENT)
Dept: FAMILY MEDICINE | Facility: CLINIC | Age: 30
End: 2025-05-29

## 2025-05-29 NOTE — TELEPHONE ENCOUNTER
"Received call from patient. He is requesting forms to be filled out stating that he needs an \"emotional support animal\" at his place of living. He has a cat. He has the forms and is wondering if PCP is okay with filling this out.    Callback: [phone]  DAGOBERTO Treadwell RN  [facility]  "

## 2025-06-03 NOTE — TELEPHONE ENCOUNTER
Routing to primary care team.     Can you assist in scheduling appt with PCP to fill out forms, per notes below.     Lay Maza RN

## 2025-08-06 DIAGNOSIS — F90.9 ATTENTION DEFICIT HYPERACTIVITY DISORDER (ADHD), UNSPECIFIED ADHD TYPE: ICD-10-CM

## 2025-08-06 RX ORDER — DEXTROAMPHETAMINE SACCHARATE, AMPHETAMINE ASPARTATE, DEXTROAMPHETAMINE SULFATE AND AMPHETAMINE SULFATE 5; 5; 5; 5 MG/1; MG/1; MG/1; MG/1
20 TABLET ORAL 2 TIMES DAILY
Qty: 60 TABLET | Refills: 0 | OUTPATIENT
Start: 2025-08-06

## 2025-08-11 ENCOUNTER — VIRTUAL VISIT (OUTPATIENT)
Dept: FAMILY MEDICINE | Facility: CLINIC | Age: 30
End: 2025-08-11

## 2025-08-11 DIAGNOSIS — F90.9 ATTENTION DEFICIT HYPERACTIVITY DISORDER (ADHD), UNSPECIFIED ADHD TYPE: Primary | ICD-10-CM

## 2025-08-11 PROCEDURE — 98005 SYNCH AUDIO-VIDEO EST LOW 20: CPT | Performed by: FAMILY MEDICINE

## 2025-08-11 RX ORDER — DEXTROAMPHETAMINE SACCHARATE, AMPHETAMINE ASPARTATE, DEXTROAMPHETAMINE SULFATE AND AMPHETAMINE SULFATE 5; 5; 5; 5 MG/1; MG/1; MG/1; MG/1
20 TABLET ORAL 2 TIMES DAILY
Qty: 60 TABLET | Refills: 0 | Status: SHIPPED | OUTPATIENT
Start: 2025-09-10 | End: 2025-10-10

## 2025-08-11 RX ORDER — DEXTROAMPHETAMINE SACCHARATE, AMPHETAMINE ASPARTATE, DEXTROAMPHETAMINE SULFATE AND AMPHETAMINE SULFATE 5; 5; 5; 5 MG/1; MG/1; MG/1; MG/1
20 TABLET ORAL 2 TIMES DAILY
Qty: 60 TABLET | Refills: 0 | Status: SHIPPED | OUTPATIENT
Start: 2025-08-11 | End: 2025-09-10

## 2025-08-11 RX ORDER — DEXTROAMPHETAMINE SACCHARATE, AMPHETAMINE ASPARTATE, DEXTROAMPHETAMINE SULFATE AND AMPHETAMINE SULFATE 5; 5; 5; 5 MG/1; MG/1; MG/1; MG/1
20 TABLET ORAL 2 TIMES DAILY
Qty: 60 TABLET | Refills: 0 | Status: SHIPPED | OUTPATIENT
Start: 2025-10-10 | End: 2025-11-09